# Patient Record
Sex: MALE | Race: WHITE | Employment: OTHER | URBAN - NONMETROPOLITAN AREA
[De-identification: names, ages, dates, MRNs, and addresses within clinical notes are randomized per-mention and may not be internally consistent; named-entity substitution may affect disease eponyms.]

---

## 2017-10-17 DIAGNOSIS — R03.0 ELEVATED BLOOD PRESSURE, SITUATIONAL: Primary | ICD-10-CM

## 2017-10-17 DIAGNOSIS — E78.5 HYPERLIPIDEMIA, UNSPECIFIED HYPERLIPIDEMIA TYPE: ICD-10-CM

## 2017-10-17 RX ORDER — ROSUVASTATIN CALCIUM 10 MG/1
TABLET, COATED ORAL
Qty: 45 TABLET | Refills: 2 | Status: SHIPPED | OUTPATIENT
Start: 2017-10-17 | End: 2018-09-27 | Stop reason: SDUPTHER

## 2017-10-30 DIAGNOSIS — E78.5 HYPERLIPIDEMIA, UNSPECIFIED HYPERLIPIDEMIA TYPE: ICD-10-CM

## 2017-10-30 DIAGNOSIS — R03.0 ELEVATED BLOOD PRESSURE, SITUATIONAL: ICD-10-CM

## 2017-10-30 LAB
ALBUMIN SERPL-MCNC: 4.3 G/DL (ref 3.5–5.2)
ALP BLD-CCNC: 68 U/L (ref 40–130)
ALT SERPL-CCNC: 19 U/L (ref 5–41)
ANION GAP SERPL CALCULATED.3IONS-SCNC: 18 MMOL/L (ref 7–19)
AST SERPL-CCNC: 16 U/L (ref 5–40)
BILIRUB SERPL-MCNC: 0.8 MG/DL (ref 0.2–1.2)
BUN BLDV-MCNC: 15 MG/DL (ref 8–23)
CALCIUM SERPL-MCNC: 9.8 MG/DL (ref 8.8–10.2)
CHLORIDE BLD-SCNC: 97 MMOL/L (ref 98–111)
CHOLESTEROL, TOTAL: 182 MG/DL (ref 160–199)
CO2: 25 MMOL/L (ref 22–29)
CREAT SERPL-MCNC: 0.9 MG/DL (ref 0.5–1.2)
GFR NON-AFRICAN AMERICAN: >60
GLUCOSE BLD-MCNC: 95 MG/DL (ref 74–109)
HCT VFR BLD CALC: 52.8 % (ref 42–52)
HDLC SERPL-MCNC: 35 MG/DL (ref 55–121)
HEMOGLOBIN: 17.6 G/DL (ref 14–18)
LDL CHOLESTEROL CALCULATED: 114 MG/DL
MCH RBC QN AUTO: 31.3 PG (ref 27–31)
MCHC RBC AUTO-ENTMCNC: 33.3 G/DL (ref 33–37)
MCV RBC AUTO: 93.8 FL (ref 80–94)
PDW BLD-RTO: 12.4 % (ref 11.5–14.5)
PLATELET # BLD: 261 K/UL (ref 130–400)
PMV BLD AUTO: 9.5 FL (ref 9.4–12.4)
POTASSIUM SERPL-SCNC: 4.3 MMOL/L (ref 3.5–5)
PROSTATE SPECIFIC ANTIGEN: 2.88 NG/ML (ref 0–4)
RBC # BLD: 5.63 M/UL (ref 4.7–6.1)
SODIUM BLD-SCNC: 140 MMOL/L (ref 136–145)
TOTAL PROTEIN: 7.8 G/DL (ref 6.6–8.7)
TRIGL SERPL-MCNC: 167 MG/DL (ref 0–149)
TSH SERPL DL<=0.05 MIU/L-ACNC: 1.45 UIU/ML (ref 0.27–4.2)
WBC # BLD: 8.5 K/UL (ref 4.8–10.8)

## 2017-11-02 RX ORDER — CHLORAL HYDRATE 500 MG
1000 CAPSULE ORAL DAILY
COMMUNITY
End: 2018-11-13

## 2017-11-07 ENCOUNTER — OFFICE VISIT (OUTPATIENT)
Dept: INTERNAL MEDICINE | Age: 72
End: 2017-11-07
Payer: COMMERCIAL

## 2017-11-07 VITALS
HEART RATE: 72 BPM | HEIGHT: 70 IN | SYSTOLIC BLOOD PRESSURE: 134 MMHG | DIASTOLIC BLOOD PRESSURE: 80 MMHG | BODY MASS INDEX: 33.5 KG/M2 | WEIGHT: 234 LBS

## 2017-11-07 DIAGNOSIS — I10 ESSENTIAL HYPERTENSION: Primary | ICD-10-CM

## 2017-11-07 DIAGNOSIS — E78.2 MIXED HYPERLIPIDEMIA: ICD-10-CM

## 2017-11-07 DIAGNOSIS — Z87.898 HISTORY OF ELEVATED PSA: ICD-10-CM

## 2017-11-07 PROCEDURE — 99214 OFFICE O/P EST MOD 30 MIN: CPT | Performed by: INTERNAL MEDICINE

## 2017-11-07 RX ORDER — LANOLIN ALCOHOL/MO/W.PET/CERES
1000 CREAM (GRAM) TOPICAL DAILY
COMMUNITY

## 2017-11-07 NOTE — PROGRESS NOTES
current care and follow-up  2. Hyperlipidemia stable continue current care and follow-up  3 history of elevated PSA he declines seeing urology but actually his PSA is better this year stable.   For recommend no smoking recommend routine screening and vaccinations which he declines keep up-to-date with routine medical care follow-up call with any problems or complaints

## 2017-11-24 PROBLEM — Z87.898 HISTORY OF ELEVATED PSA: Status: ACTIVE | Noted: 2017-11-24

## 2017-11-24 PROBLEM — Z00.00 ENCOUNTER FOR MEDICARE ANNUAL WELLNESS EXAM: Status: RESOLVED | Noted: 2017-11-24 | Resolved: 2017-11-24

## 2017-11-24 PROBLEM — Z00.00 ENCOUNTER FOR MEDICARE ANNUAL WELLNESS EXAM: Status: ACTIVE | Noted: 2017-11-24

## 2017-11-24 ASSESSMENT — ENCOUNTER SYMPTOMS
COUGH: 0
SINUS PRESSURE: 0
EYE DISCHARGE: 0
SHORTNESS OF BREATH: 0
ABDOMINAL PAIN: 0
ABDOMINAL DISTENTION: 0
EYE REDNESS: 0
BACK PAIN: 0

## 2018-01-19 ENCOUNTER — TELEPHONE (OUTPATIENT)
Dept: INTERNAL MEDICINE | Age: 73
End: 2018-01-19

## 2018-01-19 DIAGNOSIS — H10.9 CONJUNCTIVITIS, UNSPECIFIED CONJUNCTIVITIS TYPE, UNSPECIFIED LATERALITY: Primary | ICD-10-CM

## 2018-01-19 RX ORDER — TOBRAMYCIN 3 MG/ML
1 SOLUTION/ DROPS OPHTHALMIC EVERY 4 HOURS
Qty: 1 BOTTLE | Refills: 0 | Status: SHIPPED | OUTPATIENT
Start: 2018-01-19 | End: 2018-01-29

## 2018-01-19 NOTE — TELEPHONE ENCOUNTER
He refuses to be seen. Has 3 insect bites to top of head and possible pink eye. Eye is gunky and red.    Raji Serene

## 2018-01-19 NOTE — TELEPHONE ENCOUNTER
I sent in eye drops.   Just keep an eye on spider bites and if turn purple or get infected need seen - eye drops sent to [facility]

## 2018-04-23 DIAGNOSIS — Z23 NEED FOR SHINGLES VACCINE: ICD-10-CM

## 2018-09-28 RX ORDER — ROSUVASTATIN CALCIUM 10 MG/1
TABLET, COATED ORAL
Qty: 45 TABLET | Refills: 3 | Status: SHIPPED | OUTPATIENT
Start: 2018-09-28 | End: 2019-10-03 | Stop reason: SDUPTHER

## 2018-11-06 DIAGNOSIS — E78.2 MIXED HYPERLIPIDEMIA: ICD-10-CM

## 2018-11-06 LAB
ALBUMIN SERPL-MCNC: 4.2 G/DL (ref 3.5–5.2)
ALP BLD-CCNC: 65 U/L (ref 40–130)
ALT SERPL-CCNC: 21 U/L (ref 5–41)
ANION GAP SERPL CALCULATED.3IONS-SCNC: 19 MMOL/L (ref 7–19)
AST SERPL-CCNC: 18 U/L (ref 5–40)
BILIRUB SERPL-MCNC: 0.8 MG/DL (ref 0.2–1.2)
BUN BLDV-MCNC: 13 MG/DL (ref 8–23)
CALCIUM SERPL-MCNC: 10.1 MG/DL (ref 8.8–10.2)
CHLORIDE BLD-SCNC: 99 MMOL/L (ref 98–111)
CHOLESTEROL, TOTAL: 190 MG/DL (ref 160–199)
CO2: 24 MMOL/L (ref 22–29)
CREAT SERPL-MCNC: 0.9 MG/DL (ref 0.5–1.2)
GFR NON-AFRICAN AMERICAN: >60
GLUCOSE BLD-MCNC: 99 MG/DL (ref 74–109)
HCT VFR BLD CALC: 52 % (ref 42–52)
HDLC SERPL-MCNC: 38 MG/DL (ref 55–121)
HEMOGLOBIN: 16.8 G/DL (ref 14–18)
LDL CHOLESTEROL CALCULATED: 124 MG/DL
MCH RBC QN AUTO: 30.5 PG (ref 27–31)
MCHC RBC AUTO-ENTMCNC: 32.3 G/DL (ref 33–37)
MCV RBC AUTO: 94.4 FL (ref 80–94)
PDW BLD-RTO: 12.4 % (ref 11.5–14.5)
PLATELET # BLD: 244 K/UL (ref 130–400)
PMV BLD AUTO: 9.8 FL (ref 9.4–12.4)
POTASSIUM SERPL-SCNC: 4.3 MMOL/L (ref 3.5–5)
PROSTATE SPECIFIC ANTIGEN: 2.86 NG/ML (ref 0–4)
RBC # BLD: 5.51 M/UL (ref 4.7–6.1)
SODIUM BLD-SCNC: 142 MMOL/L (ref 136–145)
TOTAL PROTEIN: 7.8 G/DL (ref 6.6–8.7)
TRIGL SERPL-MCNC: 141 MG/DL (ref 0–149)
TSH SERPL DL<=0.05 MIU/L-ACNC: 1.64 UIU/ML (ref 0.27–4.2)
WBC # BLD: 8.6 K/UL (ref 4.8–10.8)

## 2018-11-13 ENCOUNTER — OFFICE VISIT (OUTPATIENT)
Dept: INTERNAL MEDICINE | Age: 73
End: 2018-11-13
Payer: COMMERCIAL

## 2018-11-13 VITALS
DIASTOLIC BLOOD PRESSURE: 80 MMHG | SYSTOLIC BLOOD PRESSURE: 132 MMHG | HEIGHT: 70 IN | BODY MASS INDEX: 33.5 KG/M2 | HEART RATE: 74 BPM | OXYGEN SATURATION: 98 % | WEIGHT: 234 LBS

## 2018-11-13 DIAGNOSIS — I10 ESSENTIAL HYPERTENSION: ICD-10-CM

## 2018-11-13 DIAGNOSIS — Z00.00 MEDICARE ANNUAL WELLNESS VISIT, SUBSEQUENT: Primary | ICD-10-CM

## 2018-11-13 DIAGNOSIS — R31.29 MICROSCOPIC HEMATURIA: ICD-10-CM

## 2018-11-13 DIAGNOSIS — E78.2 MIXED HYPERLIPIDEMIA: ICD-10-CM

## 2018-11-13 DIAGNOSIS — Z87.898 HISTORY OF ELEVATED PSA: ICD-10-CM

## 2018-11-13 DIAGNOSIS — Z12.5 SCREENING FOR PROSTATE CANCER: ICD-10-CM

## 2018-11-13 LAB
BACTERIA: NEGATIVE /HPF
BILIRUBIN URINE: NEGATIVE
BLOOD, URINE: ABNORMAL
CLARITY: CLEAR
COLOR: YELLOW
EPITHELIAL CELLS, UA: 0 /HPF (ref 0–5)
GLUCOSE URINE: NEGATIVE MG/DL
HYALINE CASTS: 0 /HPF (ref 0–8)
KETONES, URINE: NEGATIVE MG/DL
LEUKOCYTE ESTERASE, URINE: NEGATIVE
NITRITE, URINE: NEGATIVE
PH UA: 6.5
PROTEIN UA: ABNORMAL MG/DL
RBC UA: 9 /HPF (ref 0–4)
SPECIFIC GRAVITY UA: 1.02
UROBILINOGEN, URINE: 0.2 E.U./DL
WBC UA: 1 /HPF (ref 0–5)

## 2018-11-13 PROCEDURE — 99214 OFFICE O/P EST MOD 30 MIN: CPT | Performed by: INTERNAL MEDICINE

## 2018-11-13 RX ORDER — BRIMONIDINE TARTRATE, TIMOLOL MALEATE 2; 5 MG/ML; MG/ML
SOLUTION/ DROPS OPHTHALMIC
COMMUNITY
Start: 2018-04-15 | End: 2021-11-18

## 2018-11-13 RX ORDER — PREDNISOLONE ACETATE 10 MG/ML
1 SUSPENSION/ DROPS OPHTHALMIC 2 TIMES DAILY
COMMUNITY
End: 2021-11-18

## 2018-11-13 ASSESSMENT — PATIENT HEALTH QUESTIONNAIRE - PHQ9
SUM OF ALL RESPONSES TO PHQ QUESTIONS 1-9: 0
SUM OF ALL RESPONSES TO PHQ QUESTIONS 1-9: 0

## 2018-11-13 ASSESSMENT — LIFESTYLE VARIABLES: HOW OFTEN DO YOU HAVE A DRINK CONTAINING ALCOHOL: 0

## 2018-11-13 NOTE — PROGRESS NOTES
Chief Complaint   Patient presents with    Medicare AWV    Hypertension    Hyperlipidemia       HPI: Patient is here today for annual exam/ visit and to follow-up hypertension hyperlipidemia. As always he declines all routine screening recommendations see declines colonoscopy he declines seeing the urologist currently he declines any vaccinations. He says he feels well his blood pressure and medications are stable. He did have what was labeled shingles involving his right eye since I last saw him and he is still following up with ophthalmology. 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 It sounds like they treated him for shingles because of some lesions on his hand but this is difficult to sort out the lesions were not painful. Ophthalmology is following him closely. We will get his records. Past Medical History:   Diagnosis Date    Hyperlipidemia     Hypertension        Past Surgical History:   Procedure Laterality Date    TONSILLECTOMY         Family History   Problem Relation Age of Onset    High Blood Pressure Mother     Cancer Mother         supranuclear palsy    High Blood Pressure Father     Heart Disease Father        Social History     Social History    Marital status:      Spouse name: N/A    Number of children: N/A    Years of education: N/A     Occupational History    Not on file.      Social History Main Topics    Smoking status: Current Every Day Smoker     Packs/day: 1.50     Years: 35.00    Smokeless tobacco: Never Used    Alcohol use Not on file    Drug use: Unknown    Sexual activity: Not on file     Other Topics Concern    Not on file     Social History Narrative    No narrative on file       No Known Allergies    Current Outpatient Prescriptions   Medication Sig Dispense

## 2018-11-25 ASSESSMENT — ENCOUNTER SYMPTOMS
SHORTNESS OF BREATH: 0
EYE DISCHARGE: 0
ABDOMINAL PAIN: 0
ABDOMINAL DISTENTION: 0
EYE REDNESS: 0
BACK PAIN: 0
COUGH: 0
SINUS PRESSURE: 0

## 2019-03-05 ENCOUNTER — TELEPHONE (OUTPATIENT)
Dept: INTERNAL MEDICINE | Age: 74
End: 2019-03-05

## 2019-03-05 ENCOUNTER — HOSPITAL ENCOUNTER (OUTPATIENT)
Dept: GENERAL RADIOLOGY | Age: 74
Discharge: HOME | End: 2019-03-05
Payer: MEDICARE

## 2019-03-05 ENCOUNTER — OFFICE VISIT (OUTPATIENT)
Dept: INTERNAL MEDICINE | Age: 74
End: 2019-03-05
Payer: MEDICARE

## 2019-03-05 VITALS
TEMPERATURE: 97.6 F | BODY MASS INDEX: 32.35 KG/M2 | OXYGEN SATURATION: 91 % | DIASTOLIC BLOOD PRESSURE: 70 MMHG | WEIGHT: 226 LBS | HEART RATE: 79 BPM | HEIGHT: 70 IN | SYSTOLIC BLOOD PRESSURE: 120 MMHG

## 2019-03-05 DIAGNOSIS — J20.9 ACUTE BRONCHITIS, UNSPECIFIED ORGANISM: Primary | ICD-10-CM

## 2019-03-05 DIAGNOSIS — R05.9 COUGH: ICD-10-CM

## 2019-03-05 DIAGNOSIS — J20.9 ACUTE BRONCHITIS, UNSPECIFIED ORGANISM: ICD-10-CM

## 2019-03-05 DIAGNOSIS — J44.1 CHRONIC OBSTRUCTIVE PULMONARY DISEASE WITH ACUTE EXACERBATION (HCC): ICD-10-CM

## 2019-03-05 LAB
ALBUMIN SERPL-MCNC: 3.6 G/DL (ref 3.5–5.2)
ALP BLD-CCNC: 78 U/L (ref 40–130)
ALT SERPL-CCNC: 32 U/L (ref 5–41)
ANION GAP SERPL CALCULATED.3IONS-SCNC: 18 MMOL/L (ref 7–19)
AST SERPL-CCNC: 30 U/L (ref 5–40)
BILIRUB SERPL-MCNC: 0.7 MG/DL (ref 0.2–1.2)
BUN BLDV-MCNC: 9 MG/DL (ref 8–23)
C-REACTIVE PROTEIN: 11.26 MG/DL (ref 0–0.5)
CALCIUM SERPL-MCNC: 9.3 MG/DL (ref 8.8–10.2)
CHLORIDE BLD-SCNC: 85 MMOL/L (ref 98–111)
CO2: 26 MMOL/L (ref 22–29)
CREAT SERPL-MCNC: 0.8 MG/DL (ref 0.5–1.2)
GFR NON-AFRICAN AMERICAN: >60
GLUCOSE BLD-MCNC: 89 MG/DL (ref 74–109)
HCT VFR BLD CALC: 45.8 % (ref 42–52)
HEMOGLOBIN: 15.4 G/DL (ref 14–18)
MCH RBC QN AUTO: 31.1 PG (ref 27–31)
MCHC RBC AUTO-ENTMCNC: 33.6 G/DL (ref 33–37)
MCV RBC AUTO: 92.5 FL (ref 80–94)
PDW BLD-RTO: 11.9 % (ref 11.5–14.5)
PLATELET # BLD: 301 K/UL (ref 130–400)
PMV BLD AUTO: 9.6 FL (ref 9.4–12.4)
POTASSIUM SERPL-SCNC: 4 MMOL/L (ref 3.5–5)
RBC # BLD: 4.95 M/UL (ref 4.7–6.1)
SEDIMENTATION RATE, ERYTHROCYTE: 16 MM/HR (ref 0–15)
SODIUM BLD-SCNC: 129 MMOL/L (ref 136–145)
TOTAL PROTEIN: 7.7 G/DL (ref 6.6–8.7)
WBC # BLD: 11.2 K/UL (ref 4.8–10.8)

## 2019-03-05 PROCEDURE — 71046 X-RAY EXAM CHEST 2 VIEWS: CPT

## 2019-03-05 PROCEDURE — 99214 OFFICE O/P EST MOD 30 MIN: CPT | Performed by: INTERNAL MEDICINE

## 2019-03-05 PROCEDURE — 96372 THER/PROPH/DIAG INJ SC/IM: CPT | Performed by: INTERNAL MEDICINE

## 2019-03-05 RX ORDER — METHYLPREDNISOLONE ACETATE 80 MG/ML
80 INJECTION, SUSPENSION INTRA-ARTICULAR; INTRALESIONAL; INTRAMUSCULAR; SOFT TISSUE ONCE
Status: COMPLETED | OUTPATIENT
Start: 2019-03-05 | End: 2019-03-05

## 2019-03-05 RX ORDER — METHYLPREDNISOLONE 4 MG/1
TABLET ORAL
Qty: 1 KIT | Refills: 0 | Status: SHIPPED | OUTPATIENT
Start: 2019-03-05 | End: 2019-03-11

## 2019-03-05 RX ORDER — AZITHROMYCIN 250 MG/1
TABLET, FILM COATED ORAL
Qty: 6 TABLET | Refills: 0 | Status: SHIPPED | OUTPATIENT
Start: 2019-03-05 | End: 2019-04-08 | Stop reason: CLARIF

## 2019-03-05 RX ORDER — IPRATROPIUM BROMIDE AND ALBUTEROL SULFATE 2.5; .5 MG/3ML; MG/3ML
1 SOLUTION RESPIRATORY (INHALATION) EVERY 4 HOURS PRN
Qty: 50 VIAL | Refills: 0 | Status: SHIPPED | OUTPATIENT
Start: 2019-03-05 | End: 2020-11-23 | Stop reason: ALTCHOICE

## 2019-03-05 RX ORDER — CEFTRIAXONE 1 G/1
1 INJECTION, POWDER, FOR SOLUTION INTRAMUSCULAR; INTRAVENOUS ONCE
Status: COMPLETED | OUTPATIENT
Start: 2019-03-05 | End: 2019-03-05

## 2019-03-05 RX ADMIN — CEFTRIAXONE 1 G: 1 INJECTION, POWDER, FOR SOLUTION INTRAMUSCULAR; INTRAVENOUS at 12:07

## 2019-03-05 RX ADMIN — METHYLPREDNISOLONE ACETATE 80 MG: 80 INJECTION, SUSPENSION INTRA-ARTICULAR; INTRALESIONAL; INTRAMUSCULAR; SOFT TISSUE at 12:07

## 2019-03-05 ASSESSMENT — PATIENT HEALTH QUESTIONNAIRE - PHQ9
1. LITTLE INTEREST OR PLEASURE IN DOING THINGS: 0
SUM OF ALL RESPONSES TO PHQ9 QUESTIONS 1 & 2: 0
SUM OF ALL RESPONSES TO PHQ QUESTIONS 1-9: 0
SUM OF ALL RESPONSES TO PHQ QUESTIONS 1-9: 0
2. FEELING DOWN, DEPRESSED OR HOPELESS: 0

## 2019-03-08 ENCOUNTER — OFFICE VISIT (OUTPATIENT)
Dept: INTERNAL MEDICINE | Age: 74
End: 2019-03-08
Payer: MEDICARE

## 2019-03-08 VITALS
DIASTOLIC BLOOD PRESSURE: 80 MMHG | HEART RATE: 62 BPM | SYSTOLIC BLOOD PRESSURE: 120 MMHG | OXYGEN SATURATION: 94 % | WEIGHT: 227 LBS | BODY MASS INDEX: 32.5 KG/M2 | HEIGHT: 70 IN

## 2019-03-08 DIAGNOSIS — J44.1 CHRONIC OBSTRUCTIVE PULMONARY DISEASE WITH ACUTE EXACERBATION (HCC): Primary | ICD-10-CM

## 2019-03-08 DIAGNOSIS — J44.0 ACUTE BRONCHITIS WITH COPD (HCC): ICD-10-CM

## 2019-03-08 DIAGNOSIS — J20.9 ACUTE BRONCHITIS WITH COPD (HCC): ICD-10-CM

## 2019-03-08 PROCEDURE — 99213 OFFICE O/P EST LOW 20 MIN: CPT | Performed by: INTERNAL MEDICINE

## 2019-03-15 DIAGNOSIS — E87.1 HYPONATREMIA: Primary | ICD-10-CM

## 2019-03-21 PROBLEM — J20.9 ACUTE BRONCHITIS WITH COPD (HCC): Status: ACTIVE | Noted: 2019-03-21

## 2019-03-21 PROBLEM — J44.0 ACUTE BRONCHITIS WITH COPD (HCC): Status: ACTIVE | Noted: 2019-03-21

## 2019-04-02 DIAGNOSIS — E87.1 HYPONATREMIA: ICD-10-CM

## 2019-04-02 LAB
ALBUMIN SERPL-MCNC: 4.2 G/DL (ref 3.5–5.2)
ALP BLD-CCNC: 72 U/L (ref 40–130)
ALT SERPL-CCNC: 19 U/L (ref 5–41)
ANION GAP SERPL CALCULATED.3IONS-SCNC: 11 MMOL/L (ref 7–19)
AST SERPL-CCNC: 15 U/L (ref 5–40)
BILIRUB SERPL-MCNC: 0.7 MG/DL (ref 0.2–1.2)
BUN BLDV-MCNC: 16 MG/DL (ref 8–23)
C-REACTIVE PROTEIN: 0.21 MG/DL (ref 0–0.5)
CALCIUM SERPL-MCNC: 9.4 MG/DL (ref 8.8–10.2)
CHLORIDE BLD-SCNC: 96 MMOL/L (ref 98–111)
CO2: 28 MMOL/L (ref 22–29)
CREAT SERPL-MCNC: 0.9 MG/DL (ref 0.5–1.2)
GFR NON-AFRICAN AMERICAN: >60
GLUCOSE BLD-MCNC: 98 MG/DL (ref 74–109)
HCT VFR BLD CALC: 48.5 % (ref 42–52)
HEMOGLOBIN: 15.5 G/DL (ref 14–18)
MCH RBC QN AUTO: 30.8 PG (ref 27–31)
MCHC RBC AUTO-ENTMCNC: 32 G/DL (ref 33–37)
MCV RBC AUTO: 96.4 FL (ref 80–94)
PDW BLD-RTO: 12.1 % (ref 11.5–14.5)
PLATELET # BLD: 243 K/UL (ref 130–400)
PMV BLD AUTO: 9.3 FL (ref 9.4–12.4)
POTASSIUM SERPL-SCNC: 4.9 MMOL/L (ref 3.5–5)
RBC # BLD: 5.03 M/UL (ref 4.7–6.1)
SEDIMENTATION RATE, ERYTHROCYTE: 13 MM/HR (ref 0–15)
SODIUM BLD-SCNC: 135 MMOL/L (ref 136–145)
TOTAL PROTEIN: 7.5 G/DL (ref 6.6–8.7)
TSH SERPL DL<=0.05 MIU/L-ACNC: 1.7 UIU/ML (ref 0.27–4.2)
WBC # BLD: 9.5 K/UL (ref 4.8–10.8)

## 2019-04-08 ENCOUNTER — OFFICE VISIT (OUTPATIENT)
Dept: INTERNAL MEDICINE | Age: 74
End: 2019-04-08
Payer: MEDICARE

## 2019-04-08 VITALS
OXYGEN SATURATION: 95 % | HEART RATE: 69 BPM | DIASTOLIC BLOOD PRESSURE: 70 MMHG | WEIGHT: 233 LBS | BODY MASS INDEX: 33.36 KG/M2 | SYSTOLIC BLOOD PRESSURE: 110 MMHG | HEIGHT: 70 IN

## 2019-04-08 DIAGNOSIS — J44.1 CHRONIC OBSTRUCTIVE PULMONARY DISEASE WITH ACUTE EXACERBATION (HCC): Primary | ICD-10-CM

## 2019-04-08 DIAGNOSIS — E78.2 MIXED HYPERLIPIDEMIA: ICD-10-CM

## 2019-04-08 DIAGNOSIS — I10 ESSENTIAL HYPERTENSION: ICD-10-CM

## 2019-04-08 DIAGNOSIS — J44.0 ACUTE BRONCHITIS WITH COPD (HCC): ICD-10-CM

## 2019-04-08 DIAGNOSIS — J20.9 ACUTE BRONCHITIS WITH COPD (HCC): ICD-10-CM

## 2019-04-08 PROCEDURE — 99213 OFFICE O/P EST LOW 20 MIN: CPT | Performed by: INTERNAL MEDICINE

## 2019-04-08 NOTE — PROGRESS NOTES
organizations: Not on file     Relationship status: Not on file    Intimate partner violence:     Fear of current or ex partner: Not on file     Emotionally abused: Not on file     Physically abused: Not on file     Forced sexual activity: Not on file   Other Topics Concern    Not on file   Social History Narrative    Not on file       No Known Allergies    Current Outpatient Medications   Medication Sig Dispense Refill    ipratropium-albuterol (DUONEB) 0.5-2.5 (3) MG/3ML SOLN nebulizer solution Inhale 3 mLs into the lungs every 4 hours as needed for Shortness of Breath 50 vial 0    brimonidine-timolol (COMBIGAN) 0.2-0.5 % ophthalmic solution by Does not apply route      prednisoLONE acetate (PRED FORTE) 1 % ophthalmic suspension 1 drop 2 times daily      rosuvastatin (CRESTOR) 10 MG tablet TAKE ONE-HALF TABLET BY MOUTH EVERY NIGHT AT BEDTIME 45 tablet 3    vitamin B-12 (CYANOCOBALAMIN) 1000 MCG tablet Take 1,000 mcg by mouth daily      aspirin 81 MG tablet Take 81 mg by mouth daily       No current facility-administered medications for this visit. Review of Systems   Constitutional: Negative for chills, fatigue and fever. HENT: Negative for congestion and sinus pressure. Eyes: Negative for discharge and redness. Respiratory: Positive for cough. Negative for shortness of breath. Cardiovascular: Negative for chest pain, palpitations and leg swelling. Gastrointestinal: Negative for abdominal pain. Musculoskeletal: Negative for arthralgias and back pain. Skin: Negative for rash and wound. Neurological: Negative for dizziness, light-headedness and headaches. Psychiatric/Behavioral: Negative for dysphoric mood and sleep disturbance. The patient is not nervous/anxious.         /70 (Site: Left Upper Arm)   Pulse 69   Ht 5' 10\" (1.778 m)   Wt 233 lb (105.7 kg)   SpO2 95%   BMI 33.43 kg/m²   BP Readings from Last 7 Encounters:   04/08/19 110/70   03/08/19 120/80   03/05/19 120/70 11/13/18 132/80   11/07/17 134/80     Wt Readings from Last 7 Encounters:   04/08/19 233 lb (105.7 kg)   03/08/19 227 lb (103 kg)   03/05/19 226 lb (102.5 kg)   11/13/18 234 lb (106.1 kg)   11/07/17 234 lb (106.1 kg)     BMI Readings from Last 7 Encounters:   04/08/19 33.43 kg/m²   03/08/19 32.57 kg/m²   03/05/19 32.43 kg/m²   11/13/18 33.58 kg/m²   11/07/17 33.58 kg/m²     Resp Readings from Last 7 Encounters:   No data found for Resp       Physical Exam   Constitutional: No distress. Eyes: No scleral icterus. Neck: Neck supple. Cardiovascular: Normal heart sounds. Pulmonary/Chest: He has decreased breath sounds. Musculoskeletal: He exhibits no edema. Lymphadenopathy:     He has no cervical adenopathy. Skin: No rash noted. Psychiatric: He has a normal mood and affect.        Results for orders placed or performed in visit on 04/02/19   C-Reactive Protein   Result Value Ref Range    CRP 0.21 0.00 - 0.50 mg/dL   Sedimentation Rate   Result Value Ref Range    Sed Rate 13 0 - 15 mm/Hr   TSH without Reflex   Result Value Ref Range    TSH 1.700 0.270 - 4.200 uIU/mL   Comprehensive Metabolic Panel   Result Value Ref Range    Sodium 135 (L) 136 - 145 mmol/L    Potassium 4.9 3.5 - 5.0 mmol/L    Chloride 96 (L) 98 - 111 mmol/L    CO2 28 22 - 29 mmol/L    Anion Gap 11 7 - 19 mmol/L    Glucose 98 74 - 109 mg/dL    BUN 16 8 - 23 mg/dL    CREATININE 0.9 0.5 - 1.2 mg/dL    GFR Non-African American >60 >60    Calcium 9.4 8.8 - 10.2 mg/dL    Total Protein 7.5 6.6 - 8.7 g/dL    Alb 4.2 3.5 - 5.2 g/dL    Total Bilirubin 0.7 0.2 - 1.2 mg/dL    Alkaline Phosphatase 72 40 - 130 U/L    ALT 19 5 - 41 U/L    AST 15 5 - 40 U/L   CBC   Result Value Ref Range    WBC 9.5 4.8 - 10.8 K/uL    RBC 5.03 4.70 - 6.10 M/uL    Hemoglobin 15.5 14.0 - 18.0 g/dL    Hematocrit 48.5 42.0 - 52.0 %    MCV 96.4 (H) 80.0 - 94.0 fL    MCH 30.8 27.0 - 31.0 pg    MCHC 32.0 (L) 33.0 - 37.0 g/dL    RDW 12.1 11.5 - 14.5 %    Platelets 732 309 - 903 K/uL    MPV 9.3 (L) 9.4 - 12.4 fL       ASSESSMENT/ PLAN:  1. Chronic obstructive pulmonary disease with acute exacerbation ([facility] [address]  Stable greatly congratulated him on quitting smoking we talked to him about low-dose CT screening he doesn't want to do this. We are going to continue this conversation. The best thing is that he has quit smoking. He is still clearing his throat a little if this is not improved with time he will let me know. 2. Essential hypertension  Stable and f/u    3. Mixed hyperlipidemia  follow    4.  Acute bronchitis with COPD ([facility] [address]  As above- throat clearing should improve with time but if not he will let us know

## 2019-04-14 ASSESSMENT — ENCOUNTER SYMPTOMS
BACK PAIN: 0
ABDOMINAL PAIN: 0
SINUS PRESSURE: 0
EYE DISCHARGE: 0
EYE REDNESS: 0
COUGH: 1
SHORTNESS OF BREATH: 0

## 2019-07-11 ENCOUNTER — TELEPHONE (OUTPATIENT)
Dept: INTERNAL MEDICINE | Age: 74
End: 2019-07-11

## 2019-07-11 DIAGNOSIS — R25.1 TREMOR: Primary | ICD-10-CM

## 2019-07-11 NOTE — TELEPHONE ENCOUNTER
FYI - she called to report that he started having involentary tremor to upper body that keeps repeating. He feels fine otherwise. They are going to ER to check this out.

## 2019-07-15 DIAGNOSIS — R25.1 TREMOR: ICD-10-CM

## 2019-07-15 LAB
ALBUMIN SERPL-MCNC: 4.5 G/DL (ref 3.5–5.2)
ALP BLD-CCNC: 59 U/L (ref 40–130)
ALT SERPL-CCNC: 25 U/L (ref 5–41)
ANION GAP SERPL CALCULATED.3IONS-SCNC: 17 MMOL/L (ref 7–19)
AST SERPL-CCNC: 20 U/L (ref 5–40)
BILIRUB SERPL-MCNC: 1 MG/DL (ref 0.2–1.2)
BUN BLDV-MCNC: 14 MG/DL (ref 8–23)
CALCIUM SERPL-MCNC: 9.8 MG/DL (ref 8.8–10.2)
CHLORIDE BLD-SCNC: 96 MMOL/L (ref 98–111)
CO2: 23 MMOL/L (ref 22–29)
CREAT SERPL-MCNC: 1.1 MG/DL (ref 0.5–1.2)
GFR NON-AFRICAN AMERICAN: >60
GLUCOSE BLD-MCNC: 95 MG/DL (ref 74–109)
HCT VFR BLD CALC: 51.5 % (ref 42–52)
HEMOGLOBIN: 16.4 G/DL (ref 14–18)
MAGNESIUM: 2.3 MG/DL (ref 1.6–2.4)
MCH RBC QN AUTO: 30.5 PG (ref 27–31)
MCHC RBC AUTO-ENTMCNC: 31.8 G/DL (ref 33–37)
MCV RBC AUTO: 95.7 FL (ref 80–94)
PDW BLD-RTO: 11.9 % (ref 11.5–14.5)
PLATELET # BLD: 268 K/UL (ref 130–400)
PMV BLD AUTO: 9.9 FL (ref 9.4–12.4)
POTASSIUM SERPL-SCNC: 4.7 MMOL/L (ref 3.5–5)
RBC # BLD: 5.38 M/UL (ref 4.7–6.1)
SODIUM BLD-SCNC: 136 MMOL/L (ref 136–145)
TOTAL PROTEIN: 8.2 G/DL (ref 6.6–8.7)
TSH SERPL DL<=0.05 MIU/L-ACNC: 1.83 UIU/ML (ref 0.27–4.2)
WBC # BLD: 8 K/UL (ref 4.8–10.8)

## 2019-10-03 RX ORDER — ROSUVASTATIN CALCIUM 10 MG/1
TABLET, COATED ORAL
Qty: 45 TABLET | Refills: 2 | Status: SHIPPED | OUTPATIENT
Start: 2019-10-03 | End: 2020-08-07

## 2019-11-05 PROBLEM — H40.40X0 GLAUCOMA ASSOCIATED WITH OCULAR INFLAMMATION: Status: ACTIVE | Noted: 2018-11-01

## 2019-11-05 PROBLEM — H25.9 AGE-RELATED CATARACT: Status: ACTIVE | Noted: 2018-11-01

## 2019-11-12 DIAGNOSIS — R31.29 MICROSCOPIC HEMATURIA: ICD-10-CM

## 2019-11-12 DIAGNOSIS — E78.2 MIXED HYPERLIPIDEMIA: ICD-10-CM

## 2019-11-12 DIAGNOSIS — Z12.5 SCREENING FOR PROSTATE CANCER: ICD-10-CM

## 2019-11-12 LAB
ALBUMIN SERPL-MCNC: 4.1 G/DL (ref 3.5–5.2)
ALP BLD-CCNC: 62 U/L (ref 40–130)
ALT SERPL-CCNC: 18 U/L (ref 5–41)
ANION GAP SERPL CALCULATED.3IONS-SCNC: 13 MMOL/L (ref 7–19)
AST SERPL-CCNC: 16 U/L (ref 5–40)
BACTERIA: NEGATIVE /HPF
BILIRUB SERPL-MCNC: 0.4 MG/DL (ref 0.2–1.2)
BILIRUBIN URINE: NEGATIVE
BLOOD, URINE: ABNORMAL
BUN BLDV-MCNC: 18 MG/DL (ref 8–23)
CALCIUM SERPL-MCNC: 9.5 MG/DL (ref 8.8–10.2)
CHLORIDE BLD-SCNC: 101 MMOL/L (ref 98–111)
CHOLESTEROL, TOTAL: 140 MG/DL (ref 160–199)
CLARITY: CLEAR
CO2: 25 MMOL/L (ref 22–29)
COLOR: YELLOW
CREAT SERPL-MCNC: 0.8 MG/DL (ref 0.5–1.2)
EPITHELIAL CELLS, UA: 0 /HPF (ref 0–5)
GFR NON-AFRICAN AMERICAN: >60
GLUCOSE BLD-MCNC: 113 MG/DL (ref 74–109)
GLUCOSE URINE: NEGATIVE MG/DL
HCT VFR BLD CALC: 46.6 % (ref 42–52)
HDLC SERPL-MCNC: 31 MG/DL (ref 55–121)
HEMOGLOBIN: 15.3 G/DL (ref 14–18)
HYALINE CASTS: 1 /HPF (ref 0–8)
KETONES, URINE: NEGATIVE MG/DL
LDL CHOLESTEROL CALCULATED: 86 MG/DL
LEUKOCYTE ESTERASE, URINE: NEGATIVE
MCH RBC QN AUTO: 31 PG (ref 27–31)
MCHC RBC AUTO-ENTMCNC: 32.8 G/DL (ref 33–37)
MCV RBC AUTO: 94.3 FL (ref 80–94)
NITRITE, URINE: NEGATIVE
PDW BLD-RTO: 12.1 % (ref 11.5–14.5)
PH UA: 6 (ref 5–8)
PLATELET # BLD: 221 K/UL (ref 130–400)
PMV BLD AUTO: 10.1 FL (ref 9.4–12.4)
POTASSIUM SERPL-SCNC: 4 MMOL/L (ref 3.5–5)
PROSTATE SPECIFIC ANTIGEN: 3.03 NG/ML (ref 0–4)
PROTEIN UA: NEGATIVE MG/DL
RBC # BLD: 4.94 M/UL (ref 4.7–6.1)
RBC UA: 2 /HPF (ref 0–4)
SODIUM BLD-SCNC: 139 MMOL/L (ref 136–145)
SPECIFIC GRAVITY UA: 1.02 (ref 1–1.03)
TOTAL PROTEIN: 7.3 G/DL (ref 6.6–8.7)
TRIGL SERPL-MCNC: 115 MG/DL (ref 0–149)
TSH SERPL DL<=0.05 MIU/L-ACNC: 0.97 UIU/ML (ref 0.27–4.2)
UROBILINOGEN, URINE: 0.2 E.U./DL
WBC # BLD: 7.2 K/UL (ref 4.8–10.8)
WBC UA: 1 /HPF (ref 0–5)

## 2019-11-19 ENCOUNTER — OFFICE VISIT (OUTPATIENT)
Dept: INTERNAL MEDICINE | Age: 74
End: 2019-11-19
Payer: MEDICARE

## 2019-11-19 VITALS
HEIGHT: 70 IN | OXYGEN SATURATION: 96 % | SYSTOLIC BLOOD PRESSURE: 110 MMHG | WEIGHT: 231 LBS | HEART RATE: 65 BPM | DIASTOLIC BLOOD PRESSURE: 60 MMHG | BODY MASS INDEX: 33.07 KG/M2

## 2019-11-19 DIAGNOSIS — I10 ESSENTIAL HYPERTENSION: ICD-10-CM

## 2019-11-19 DIAGNOSIS — Z12.5 SCREENING FOR PROSTATE CANCER: ICD-10-CM

## 2019-11-19 DIAGNOSIS — H61.23 BILATERAL HEARING LOSS DUE TO CERUMEN IMPACTION: ICD-10-CM

## 2019-11-19 DIAGNOSIS — Z87.898 HISTORY OF ELEVATED PSA: ICD-10-CM

## 2019-11-19 DIAGNOSIS — J44.1 CHRONIC OBSTRUCTIVE PULMONARY DISEASE WITH ACUTE EXACERBATION (HCC): ICD-10-CM

## 2019-11-19 DIAGNOSIS — H40.40X0 GLAUCOMA ASSOCIATED WITH OCULAR INFLAMMATION, UNSPECIFIED GLAUCOMA STAGE, UNSPECIFIED LATERALITY: ICD-10-CM

## 2019-11-19 DIAGNOSIS — R73.9 HYPERGLYCEMIA: ICD-10-CM

## 2019-11-19 DIAGNOSIS — E78.2 MIXED HYPERLIPIDEMIA: ICD-10-CM

## 2019-11-19 DIAGNOSIS — Z00.00 MEDICARE ANNUAL WELLNESS VISIT, SUBSEQUENT: Primary | ICD-10-CM

## 2019-11-19 LAB — HBA1C MFR BLD: 5.9 %

## 2019-11-19 PROCEDURE — 99214 OFFICE O/P EST MOD 30 MIN: CPT | Performed by: INTERNAL MEDICINE

## 2019-11-19 PROCEDURE — 83036 HEMOGLOBIN GLYCOSYLATED A1C: CPT | Performed by: INTERNAL MEDICINE

## 2019-11-19 ASSESSMENT — ENCOUNTER SYMPTOMS
ABDOMINAL PAIN: 0
ABDOMINAL DISTENTION: 0
SINUS PRESSURE: 0
EYE REDNESS: 0
COUGH: 0
SHORTNESS OF BREATH: 0
BACK PAIN: 0
EYE DISCHARGE: 0

## 2020-08-07 RX ORDER — ROSUVASTATIN CALCIUM 10 MG/1
TABLET, COATED ORAL
Qty: 45 TABLET | Refills: 1 | Status: SHIPPED | OUTPATIENT
Start: 2020-08-07 | End: 2021-03-16

## 2020-11-16 DIAGNOSIS — Z12.5 SCREENING FOR PROSTATE CANCER: ICD-10-CM

## 2020-11-16 DIAGNOSIS — E78.2 MIXED HYPERLIPIDEMIA: ICD-10-CM

## 2020-11-16 DIAGNOSIS — R73.9 HYPERGLYCEMIA: ICD-10-CM

## 2020-11-16 LAB
ALBUMIN SERPL-MCNC: 4.8 G/DL (ref 3.5–5.2)
ALP BLD-CCNC: 66 U/L (ref 40–130)
ALT SERPL-CCNC: 19 U/L (ref 5–41)
ANION GAP SERPL CALCULATED.3IONS-SCNC: 9 MMOL/L (ref 7–19)
AST SERPL-CCNC: 16 U/L (ref 5–40)
BILIRUB SERPL-MCNC: 0.5 MG/DL (ref 0.2–1.2)
BUN BLDV-MCNC: 23 MG/DL (ref 8–23)
CALCIUM SERPL-MCNC: 9.5 MG/DL (ref 8.8–10.2)
CHLORIDE BLD-SCNC: 104 MMOL/L (ref 98–111)
CHOLESTEROL, TOTAL: 168 MG/DL (ref 160–199)
CO2: 27 MMOL/L (ref 22–29)
CREAT SERPL-MCNC: 0.7 MG/DL (ref 0.5–1.2)
GFR AFRICAN AMERICAN: >59
GFR NON-AFRICAN AMERICAN: >60
GLUCOSE BLD-MCNC: 103 MG/DL (ref 74–109)
HBA1C MFR BLD: 5.7 % (ref 4–6)
HCT VFR BLD CALC: 48.5 % (ref 42–52)
HDLC SERPL-MCNC: 45 MG/DL (ref 55–121)
HEMOGLOBIN: 15.6 G/DL (ref 14–18)
LDL CHOLESTEROL CALCULATED: 110 MG/DL
MCH RBC QN AUTO: 31 PG (ref 27–31)
MCHC RBC AUTO-ENTMCNC: 32.2 G/DL (ref 33–37)
MCV RBC AUTO: 96.2 FL (ref 80–94)
PDW BLD-RTO: 12.4 % (ref 11.5–14.5)
PLATELET # BLD: 241 K/UL (ref 130–400)
PMV BLD AUTO: 9.6 FL (ref 9.4–12.4)
POTASSIUM SERPL-SCNC: 4.2 MMOL/L (ref 3.5–5)
PROSTATE SPECIFIC ANTIGEN: 3.24 NG/ML (ref 0–4)
RBC # BLD: 5.04 M/UL (ref 4.7–6.1)
SODIUM BLD-SCNC: 140 MMOL/L (ref 136–145)
TOTAL PROTEIN: 7.4 G/DL (ref 6.6–8.7)
TRIGL SERPL-MCNC: 65 MG/DL (ref 0–149)
TSH SERPL DL<=0.05 MIU/L-ACNC: 1.57 UIU/ML (ref 0.27–4.2)
WBC # BLD: 6.6 K/UL (ref 4.8–10.8)

## 2020-11-20 ASSESSMENT — LIFESTYLE VARIABLES
HAS A RELATIVE, FRIEND, DOCTOR, OR ANOTHER HEALTH PROFESSIONAL EXPRESSED CONCERN ABOUT YOUR DRINKING OR SUGGESTED YOU CUT DOWN: NO
HOW OFTEN DURING THE LAST YEAR HAVE YOU FOUND THAT YOU WERE NOT ABLE TO STOP DRINKING ONCE YOU HAD STARTED: 0
HOW OFTEN DURING THE LAST YEAR HAVE YOU HAD A FEELING OF GUILT OR REMORSE AFTER DRINKING: NEVER
HOW OFTEN DO YOU HAVE SIX OR MORE DRINKS ON ONE OCCASION: 0
HOW OFTEN DO YOU HAVE A DRINK CONTAINING ALCOHOL: 1
AUDIT-C TOTAL SCORE: 0
AUDIT-C TOTAL SCORE: 1
HOW MANY STANDARD DRINKS CONTAINING ALCOHOL DO YOU HAVE ON A TYPICAL DAY: ONE OR TWO
HOW MANY STANDARD DRINKS CONTAINING ALCOHOL DO YOU HAVE ON A TYPICAL DAY: 0
HOW OFTEN DO YOU HAVE SIX OR MORE DRINKS ON ONE OCCASION: NEVER
AUDIT TOTAL SCORE: 1
HOW OFTEN DURING THE LAST YEAR HAVE YOU FAILED TO DO WHAT WAS NORMALLY EXPECTED FROM YOU BECAUSE OF DRINKING: 0
HOW OFTEN DURING THE LAST YEAR HAVE YOU BEEN UNABLE TO REMEMBER WHAT HAPPENED THE NIGHT BEFORE BECAUSE YOU HAD BEEN DRINKING: NEVER
HOW OFTEN DURING THE LAST YEAR HAVE YOU BEEN UNABLE TO REMEMBER WHAT HAPPENED THE NIGHT BEFORE BECAUSE YOU HAD BEEN DRINKING: 0
HOW OFTEN DURING THE LAST YEAR HAVE YOU FAILED TO DO WHAT WAS NORMALLY EXPECTED FROM YOU BECAUSE OF DRINKING: NEVER
HOW OFTEN DURING THE LAST YEAR HAVE YOU FOUND THAT YOU WERE NOT ABLE TO STOP DRINKING ONCE YOU HAD STARTED: NEVER
HOW OFTEN DURING THE LAST YEAR HAVE YOU NEEDED AN ALCOHOLIC DRINK FIRST THING IN THE MORNING TO GET YOURSELF GOING AFTER A NIGHT OF HEAVY DRINKING: 0
HOW OFTEN DURING THE LAST YEAR HAVE YOU NEEDED AN ALCOHOLIC DRINK FIRST THING IN THE MORNING TO GET YOURSELF GOING AFTER A NIGHT OF HEAVY DRINKING: NEVER
HAVE YOU OR SOMEONE ELSE BEEN INJURED AS A RESULT OF YOUR DRINKING: NO
HOW OFTEN DURING THE LAST YEAR HAVE YOU HAD A FEELING OF GUILT OR REMORSE AFTER DRINKING: 0
HOW OFTEN DO YOU HAVE A DRINK CONTAINING ALCOHOL: MONTHLY OR LESS
AUDIT TOTAL SCORE: 0
HAS A RELATIVE, FRIEND, DOCTOR, OR ANOTHER HEALTH PROFESSIONAL EXPRESSED CONCERN ABOUT YOUR DRINKING OR SUGGESTED YOU CUT DOWN: 0
HAVE YOU OR SOMEONE ELSE BEEN INJURED AS A RESULT OF YOUR DRINKING: 0

## 2020-11-20 ASSESSMENT — PATIENT HEALTH QUESTIONNAIRE - PHQ9
2. FEELING DOWN, DEPRESSED OR HOPELESS: 0
SUM OF ALL RESPONSES TO PHQ9 QUESTIONS 1 & 2: 0
SUM OF ALL RESPONSES TO PHQ QUESTIONS 1-9: 0
1. LITTLE INTEREST OR PLEASURE IN DOING THINGS: 0
SUM OF ALL RESPONSES TO PHQ QUESTIONS 1-9: 0
SUM OF ALL RESPONSES TO PHQ QUESTIONS 1-9: 0

## 2020-11-23 ENCOUNTER — OFFICE VISIT (OUTPATIENT)
Dept: INTERNAL MEDICINE | Age: 75
End: 2020-11-23
Payer: MEDICARE

## 2020-11-23 VITALS
HEART RATE: 54 BPM | SYSTOLIC BLOOD PRESSURE: 116 MMHG | HEIGHT: 70 IN | OXYGEN SATURATION: 95 % | WEIGHT: 201 LBS | DIASTOLIC BLOOD PRESSURE: 70 MMHG | BODY MASS INDEX: 28.77 KG/M2

## 2020-11-23 PROCEDURE — G0439 PPPS, SUBSEQ VISIT: HCPCS | Performed by: INTERNAL MEDICINE

## 2020-11-23 NOTE — PROGRESS NOTES
Chief Complaint   Patient presents with    Medicare AWV    COPD       HPI: Patient is here today for Medicare annual wellness visit and to follow-up COPD hypertension hyperlipidemia history of glaucoma. Has some watery eyes at times dry eyes at times no new vision change no headache no chest pain no dyspnea no abdominal pain he said an excellent job losing weight keeping his weight off staying off alcohol and staying off tobacco we congratulated him on this. He denies any fever chills nausea vomiting or diarrhea.     Past Medical History:   Diagnosis Date    Hyperlipidemia     Hypertension        Past Surgical History:   Procedure Laterality Date    TONSILLECTOMY         Family History   Problem Relation Age of Onset    High Blood Pressure Mother     Cancer Mother         supranuclear palsy    High Blood Pressure Father     Heart Disease Father        Social History     Socioeconomic History    Marital status:      Spouse name: Not on file    Number of children: Not on file    Years of education: Not on file    Highest education level: Not on file   Occupational History    Not on file   Social Needs    Financial resource strain: Not on file    Food insecurity     Worry: Not on file     Inability: Not on file    Transportation needs     Medical: Not on file     Non-medical: Not on file   Tobacco Use    Smoking status: Former Smoker     Packs/day: 1.50     Years: 35.00     Pack years: 52.50     Last attempt to quit: 3/4/2019     Years since quittin.7    Smokeless tobacco: Never Used   Substance and Sexual Activity    Alcohol use: Not on file    Drug use: Not on file    Sexual activity: Not on file   Lifestyle    Physical activity     Days per week: Not on file     Minutes per session: Not on file    Stress: Not on file   Relationships    Social connections     Talks on phone: Not on file     Gets together: Not on file     Attends Buddhist service: Not on file     Active member of 03/08/19 120/80   03/05/19 120/70   11/13/18 132/80   11/07/17 134/80     Wt Readings from Last 7 Encounters:   11/23/20 201 lb (91.2 kg)   11/19/19 231 lb (104.8 kg)   04/08/19 233 lb (105.7 kg)   03/08/19 227 lb (103 kg)   03/05/19 226 lb (102.5 kg)   11/13/18 234 lb (106.1 kg)   11/07/17 234 lb (106.1 kg)     BMI Readings from Last 7 Encounters:   11/23/20 28.84 kg/m²   11/19/19 33.15 kg/m²   04/08/19 33.43 kg/m²   03/08/19 32.57 kg/m²   03/05/19 32.43 kg/m²   11/13/18 33.58 kg/m²   11/07/17 33.58 kg/m²     Resp Readings from Last 7 Encounters:   No data found for Resp       Physical Exam  Constitutional:       General: He is not in acute distress. Appearance: Normal appearance. He is well-developed. HENT:      Right Ear: External ear normal. Tympanic membrane is not injected. Left Ear: External ear normal. Tympanic membrane is not injected. Mouth/Throat:      Pharynx: No oropharyngeal exudate. Eyes:      General: No scleral icterus. Conjunctiva/sclera: Conjunctivae normal.   Neck:      Musculoskeletal: Neck supple. Thyroid: No thyroid mass or thyromegaly. Vascular: No carotid bruit. Cardiovascular:      Rate and Rhythm: Normal rate and regular rhythm. Heart sounds: S1 normal and S2 normal. No murmur. No S3 or S4 sounds. Pulmonary:      Effort: Pulmonary effort is normal. No respiratory distress. Breath sounds: Normal breath sounds. No wheezing or rales. Abdominal:      General: Bowel sounds are normal. There is no distension. Palpations: Abdomen is soft. There is no mass. Tenderness: There is no abdominal tenderness. Lymphadenopathy:      Cervical: No cervical adenopathy. Upper Body:      Right upper body: No supraclavicular adenopathy. Left upper body: No supraclavicular adenopathy. Skin:     Findings: No rash. Neurological:      Mental Status: He is alert and oriented to person, place, and time.       Cranial Nerves: No cranial nerve deficit. Results for orders placed or performed in visit on 11/16/20   Psa screening   Result Value Ref Range    PSA 3.24 0.00 - 4.00 ng/mL   Lipid Panel   Result Value Ref Range    Cholesterol, Total 168 160 - 199 mg/dL    Triglycerides 65 0 - 149 mg/dL    HDL 45 (L) 55 - 121 mg/dL    LDL Calculated 110 <100 mg/dL   TSH without Reflex   Result Value Ref Range    TSH 1.570 0.270 - 4.200 uIU/mL   Hemoglobin A1C   Result Value Ref Range    Hemoglobin A1C 5.7 4.0 - 6.0 %   Comprehensive Metabolic Panel   Result Value Ref Range    Sodium 140 136 - 145 mmol/L    Potassium 4.2 3.5 - 5.0 mmol/L    Chloride 104 98 - 111 mmol/L    CO2 27 22 - 29 mmol/L    Anion Gap 9 7 - 19 mmol/L    Glucose 103 74 - 109 mg/dL    BUN 23 8 - 23 mg/dL    CREATININE 0.7 0.5 - 1.2 mg/dL    GFR Non-African American >60 >60    GFR African American >59 >59    Calcium 9.5 8.8 - 10.2 mg/dL    Total Protein 7.4 6.6 - 8.7 g/dL    Alb 4.8 3.5 - 5.2 g/dL    Total Bilirubin 0.5 0.2 - 1.2 mg/dL    Alkaline Phosphatase 66 40 - 130 U/L    ALT 19 5 - 41 U/L    AST 16 5 - 40 U/L   CBC   Result Value Ref Range    WBC 6.6 4.8 - 10.8 K/uL    RBC 5.04 4.70 - 6.10 M/uL    Hemoglobin 15.6 14.0 - 18.0 g/dL    Hematocrit 48.5 42.0 - 52.0 %    MCV 96.2 (H) 80.0 - 94.0 fL    MCH 31.0 27.0 - 31.0 pg    MCHC 32.2 (L) 33.0 - 37.0 g/dL    RDW 12.4 11.5 - 14.5 %    Platelets 781 935 - 126 K/uL    MPV 9.6 9.4 - 12.4 fL       ASSESSMENT/ PLAN:  1. Medicare annual wellness visit, subsequent  Chart medications labs vaccines reviewed keep up-to-date with routine medical care and follow-up call with any problems or complaints. He declines all vaccines he declines colonoscopy CT screening for lung cancer etc.    2. Routine general medical examination at a health care facility  As above he declines all routine screening recommendations    3. History of elevated PSA  Elevated PSA but still in the normal range has seen urology in the past no new symptoms follow    4. Screening for prostate cancer    - Psa screening; Future    5. Mixed hyperlipidemia    - CBC; Future  - Comprehensive Metabolic Panel; Future  - TSH without Reflex; Future  - Lipid Panel; Future    6. Hyperglycemia    - Hemoglobin A1C; Future    7. Essential hypertension  Stable and f/u    8. Chronic obstructive pulmonary disease with acute exacerbation (HCC)  Stable and f/u    9. Glaucoma associated with ocular inflammation, unspecified glaucoma stage, unspecified laterality  We will make an appointment with Dr. Ruth Ann Wilson check    8. Bilateral impacted cerumen  We were going to flush his ears but did not we can do this in the future                    Medicare Annual Wellness Visit  Name: Agusto Syed Date: 2020   MRN: [id number] Sex: Male   Age: 67 y.o. Ethnicity: Non-/Non    : 1945 Race: Maritza Carrion is here for Medicare AWV and COPD    Screenings for behavioral, psychosocial and functional/safety risks, and cognitive dysfunction are all negative except as indicated below. These results, as well as other patient data from the [address] form, are documented in Flowsheets linked to this Encounter. No Known Allergies      Prior to Visit Medications    Medication Sig Taking?  Authorizing Provider   rosuvastatin (CRESTOR) 10 MG tablet TAKE ONE-HALF TABLET BY MOUTH AT BEDTIME  Marquez Nieves MD   brimonidine-timolol (COMBIGAN) 0.2-0.5 % ophthalmic solution by Does not apply route  Historical Provider, MD   prednisoLONE acetate (PRED FORTE) 1 % ophthalmic suspension 1 drop 2 times daily  Historical Provider, MD   vitamin B-12 (CYANOCOBALAMIN) 1000 MCG tablet Take 1,000 mcg by mouth daily  Historical Provider, MD   aspirin 81 MG tablet Take 81 mg by mouth daily  Historical Provider, MD         Past Medical History:   Diagnosis Date    Hyperlipidemia     Hypertension        Past Surgical History:   Procedure Laterality Date    TONSILLECTOMY           Family History   Problem Relation Age of Onset    High Blood Pressure Mother     Cancer Mother         supranuclear palsy    High Blood Pressure Father     Heart Disease Father        CareTeam (Including outside providers/suppliers regularly involved in providing care):   Patient Care Team:  Ava Brannon MD as PCP - General (Internal Medicine)  Ava Brannon MD as PCP - [facility]    Wt Readings from Last 3 Encounters:   11/23/20 201 lb (91.2 kg)   11/19/19 231 lb (104.8 kg)   04/08/19 233 lb (105.7 kg)     Vitals:    11/23/20 1239   BP: 116/70   Pulse: 54   SpO2: 95%   Weight: 201 lb (91.2 kg)   Height: 5' 10\" (1.778 m)     Body mass index is 28.84 kg/m². Based upon direct observation of the patient, evaluation of cognition reveals no issues    Patient's complete Health Risk Assessment and screening values have been reviewed and are found in Flowsheets. The following problems were reviewed today and where indicated follow up appointments were made and/or referrals ordered. Positive Risk Factor Screenings with Interventions:       General Health and ACP:  General  In general, how would you say your health is?: Good  In the past 7 days, have you experienced any of the following?  New or Increased Pain, New or Increased Fatigue, Loneliness, Social Isolation, Stress or Anger?: None of These  Do you get the social and emotional support that you need?: Yes  Do you have a Living Will?: Yes  Advance Directives     Power of [address] Will ACP-Advance Directive ACP-Power of     Not on File Not on File Filed Filed      General Health Risk Interventions:  · Lives with wife    Health Habits/Nutrition:  Health Habits/Nutrition  Do you exercise for at least 20 minutes 2-3 times per week?: Yes  Have you lost any weight without trying in the past 3 months?: No  Do you eat fewer than 2 meals per day?: (!) Yes  Have you seen a dentist within the past year?: Yes  Body mass index: (!) 28.84  Health Habits/Nutrition Interventions:  · Great job with stopping drinking stopping smoking and with weight loss    Hearing/Vision:  No exam data present  Hearing/Vision  Do you or your family notice any trouble with your hearing?: No  Do you have difficulty driving, watching TV, or doing any of your daily activities because of your eyesight?: No  Have you had an eye exam within the past year?: (!) No  Hearing/Vision Interventions:  · Patient and his wife are scheduling an appointment with ophthalmology    Personalized Preventive Plan   Current Health Maintenance Status    There is no immunization history on file for this patient. Health Maintenance   Topic Date Due    DTaP/Tdap/Td vaccine (1 - Tdap) 09[birth year]06/1963    Shingles Vaccine (1 of 2) 09/06/1994    Pneumococcal 65+ years Vaccine (1 of 1 - PPSV23) 09/06/2009    Annual Wellness Visit (AWV)  05/29/2019    Flu vaccine (1) 09/01/2020    Lipid screen  11/16/2021    Hepatitis A vaccine  Aged Out    Hepatitis B vaccine  Aged Out    Hib vaccine  Aged Out    Meningococcal (ACWY) vaccine  Aged Out     Recommendations for [employer] Due: see orders and patient instructions/AVS.  . Recommended screening schedule for the next 5-10 years is provided to the patient in written form: see Patient Instructions/AVS.    Dedra Randhawa was seen today for medicare awv and copd. Diagnoses and all orders for this visit:    Medicare annual wellness visit, subsequent    Routine general medical examination at a health care facility    History of elevated PSA    Screening for prostate cancer  -     Psa screening; Future    Mixed hyperlipidemia  -     CBC; Future  -     Comprehensive Metabolic Panel; Future  -     TSH without Reflex; Future  -     Lipid Panel;  Future    Hyperglycemia  -     Hemoglobin A1C; Future    Essential hypertension    Chronic obstructive pulmonary disease with acute exacerbation (HCC)    Glaucoma associated with ocular inflammation, unspecified glaucoma

## 2020-11-23 NOTE — PATIENT INSTRUCTIONS
Personalized Preventive Plan for Isidoro Bullard - 11/23/2020  Medicare offers a range of preventive health benefits. Some of the tests and screenings are paid in full while other may be subject to a deductible, co-insurance, and/or copay. Some of these benefits include a comprehensive review of your medical history including lifestyle, illnesses that may run in your family, and various assessments and screenings as appropriate. After reviewing your medical record and screening and assessments performed today your provider may have ordered immunizations, labs, imaging, and/or referrals for you. A list of these orders (if applicable) as well as your Preventive Care list are included within your After Visit Summary for your review. Other Preventive Recommendations:    · A preventive eye exam performed by an eye specialist is recommended every 1-2 years to screen for glaucoma; cataracts, macular degeneration, and other eye disorders. · A preventive dental visit is recommended every 6 months. · Try to get at least 150 minutes of exercise per week or 10,000 steps per day on a pedometer . · Order or download the FREE \"Exercise & Physical Activity: Your Everyday Guide\" from [employer]. Call 8-244.393.4773 or search [employer] online. · You need 2970-4265 mg of calcium and 6066-9686 IU of vitamin D per day. It is possible to meet your calcium requirement with diet alone, but a vitamin D supplement is usually necessary to meet this goal.  · When exposed to the sun, use a sunscreen that protects against both UVA and UVB radiation with an SPF of 30 or greater. Reapply every 2 to 3 hours or after sweating, drying off with a towel, or swimming. · Always wear a seat belt when traveling in a car. Always wear a helmet when riding a bicycle or motorcycle.

## 2020-11-26 PROBLEM — H61.23 BILATERAL IMPACTED CERUMEN: Status: ACTIVE | Noted: 2020-11-26

## 2020-11-26 ASSESSMENT — ENCOUNTER SYMPTOMS
COUGH: 0
SINUS PRESSURE: 0
ABDOMINAL PAIN: 0
ABDOMINAL DISTENTION: 0
SHORTNESS OF BREATH: 0
EYE DISCHARGE: 0
BACK PAIN: 0
EYE REDNESS: 0

## 2021-11-11 DIAGNOSIS — R73.9 HYPERGLYCEMIA: ICD-10-CM

## 2021-11-11 DIAGNOSIS — Z12.5 SCREENING FOR PROSTATE CANCER: ICD-10-CM

## 2021-11-11 DIAGNOSIS — E78.2 MIXED HYPERLIPIDEMIA: ICD-10-CM

## 2021-11-11 LAB
ALBUMIN SERPL-MCNC: 4.3 G/DL (ref 3.5–5.2)
ALP BLD-CCNC: 65 U/L (ref 40–130)
ALT SERPL-CCNC: 23 U/L (ref 5–41)
ANION GAP SERPL CALCULATED.3IONS-SCNC: 11 MMOL/L (ref 7–19)
AST SERPL-CCNC: 33 U/L (ref 5–40)
BILIRUB SERPL-MCNC: 0.8 MG/DL (ref 0.2–1.2)
BUN BLDV-MCNC: 22 MG/DL (ref 8–23)
CALCIUM SERPL-MCNC: 9.7 MG/DL (ref 8.8–10.2)
CHLORIDE BLD-SCNC: 101 MMOL/L (ref 98–111)
CHOLESTEROL, TOTAL: 173 MG/DL (ref 160–199)
CO2: 25 MMOL/L (ref 22–29)
CREAT SERPL-MCNC: 0.9 MG/DL (ref 0.5–1.2)
GFR AFRICAN AMERICAN: >59
GFR NON-AFRICAN AMERICAN: >60
GLUCOSE BLD-MCNC: 98 MG/DL (ref 74–109)
HBA1C MFR BLD: 5.7 % (ref 4–6)
HCT VFR BLD CALC: 45.3 % (ref 42–52)
HDLC SERPL-MCNC: 36 MG/DL (ref 55–121)
HEMOGLOBIN: 15 G/DL (ref 14–18)
LDL CHOLESTEROL CALCULATED: 115 MG/DL
MCH RBC QN AUTO: 31.3 PG (ref 27–31)
MCHC RBC AUTO-ENTMCNC: 33.1 G/DL (ref 33–37)
MCV RBC AUTO: 94.4 FL (ref 80–94)
PDW BLD-RTO: 12.2 % (ref 11.5–14.5)
PLATELET # BLD: 226 K/UL (ref 130–400)
PMV BLD AUTO: 10.5 FL (ref 9.4–12.4)
POTASSIUM SERPL-SCNC: 4.2 MMOL/L (ref 3.5–5)
PROSTATE SPECIFIC ANTIGEN: 3.35 NG/ML (ref 0–4)
RBC # BLD: 4.8 M/UL (ref 4.7–6.1)
SODIUM BLD-SCNC: 137 MMOL/L (ref 136–145)
TOTAL PROTEIN: 7.3 G/DL (ref 6.6–8.7)
TRIGL SERPL-MCNC: 109 MG/DL (ref 0–149)
TSH SERPL DL<=0.05 MIU/L-ACNC: 1.18 UIU/ML (ref 0.27–4.2)
WBC # BLD: 8 K/UL (ref 4.8–10.8)

## 2021-11-11 ASSESSMENT — PATIENT HEALTH QUESTIONNAIRE - PHQ9
SUM OF ALL RESPONSES TO PHQ QUESTIONS 1-9: 0
SUM OF ALL RESPONSES TO PHQ QUESTIONS 1-9: 0
1. LITTLE INTEREST OR PLEASURE IN DOING THINGS: 0
SUM OF ALL RESPONSES TO PHQ QUESTIONS 1-9: 0
SUM OF ALL RESPONSES TO PHQ9 QUESTIONS 1 & 2: 0
2. FEELING DOWN, DEPRESSED OR HOPELESS: 0

## 2021-11-11 ASSESSMENT — LIFESTYLE VARIABLES
HOW OFTEN DO YOU HAVE A DRINK CONTAINING ALCOHOL: 0
AUDIT TOTAL SCORE: INCOMPLETE
AUDIT-C TOTAL SCORE: INCOMPLETE
HOW OFTEN DO YOU HAVE A DRINK CONTAINING ALCOHOL: NEVER

## 2021-11-18 ENCOUNTER — OFFICE VISIT (OUTPATIENT)
Dept: INTERNAL MEDICINE | Age: 76
End: 2021-11-18
Payer: MEDICARE

## 2021-11-18 VITALS
SYSTOLIC BLOOD PRESSURE: 128 MMHG | HEART RATE: 58 BPM | HEIGHT: 70 IN | DIASTOLIC BLOOD PRESSURE: 72 MMHG | BODY MASS INDEX: 30.35 KG/M2 | WEIGHT: 212 LBS | OXYGEN SATURATION: 97 %

## 2021-11-18 DIAGNOSIS — H61.21 IMPACTED CERUMEN OF RIGHT EAR: ICD-10-CM

## 2021-11-18 DIAGNOSIS — Z00.00 ROUTINE GENERAL MEDICAL EXAMINATION AT A HEALTH CARE FACILITY: ICD-10-CM

## 2021-11-18 DIAGNOSIS — E78.2 MIXED HYPERLIPIDEMIA: ICD-10-CM

## 2021-11-18 DIAGNOSIS — R73.9 HYPERGLYCEMIA: ICD-10-CM

## 2021-11-18 DIAGNOSIS — Z00.00 MEDICARE ANNUAL WELLNESS VISIT, SUBSEQUENT: Primary | ICD-10-CM

## 2021-11-18 DIAGNOSIS — J44.1 CHRONIC OBSTRUCTIVE PULMONARY DISEASE WITH ACUTE EXACERBATION (HCC): ICD-10-CM

## 2021-11-18 DIAGNOSIS — Z12.5 SCREENING FOR PROSTATE CANCER: ICD-10-CM

## 2021-11-18 DIAGNOSIS — Z87.898 HISTORY OF ELEVATED PSA: ICD-10-CM

## 2021-11-18 PROCEDURE — G0439 PPPS, SUBSEQ VISIT: HCPCS | Performed by: INTERNAL MEDICINE

## 2021-11-18 NOTE — PROGRESS NOTES
Chief Complaint   Patient presents with    Medicare AWV     wellness visit       HPI: Patient is here today for Medicare annual illness visit and to follow-up hyperlipidemia history of elevated PSA and COPD. He quit smoking he lost a lot of weight he is doing okay holding stable mood seems stable he always declines any screening medical recommendation. Past Medical History:   Diagnosis Date    Hyperlipidemia     Hypertension        Past Surgical History:   Procedure Laterality Date    TONSILLECTOMY         Family History   Problem Relation Age of Onset    High Blood Pressure Mother     Cancer Mother         supranuclear palsy    High Blood Pressure Father     Heart Disease Father        Social History     Socioeconomic History    Marital status:      Spouse name: Not on file    Number of children: Not on file    Years of education: Not on file    Highest education level: Not on file   Occupational History    Not on file   Tobacco Use    Smoking status: Former Smoker     Packs/day: 1.50     Years: 35.00     Pack years: 52.50     Quit date: 3/4/2019     Years since quittin.7    Smokeless tobacco: Never Used   Substance and Sexual Activity    Alcohol use: Not on file    Drug use: Not on file    Sexual activity: Not on file   Other Topics Concern    Not on file   Social History Narrative    Not on file     Social Determinants of Health     Financial Resource Strain:     Difficulty of Paying Living Expenses: Not on file   Food Insecurity:     Worried About [employer] [employer] in the Last Year: Not on file    [place] of Food in the Last Year: Not on file   Transportation Needs:     Lack of Transportation (Medical): Not on file    Lack of Transportation (Non-Medical):  Not on file   Physical Activity:     Days of Exercise per Week: Not on file    Minutes of Exercise per Session: Not on file   Stress:     Feeling of Stress : Not on file   Social Connections:     Frequency of Communication with Friends and Family: Not on file    Frequency of Social Gatherings with Friends and Family: Not on file    Attends Jehovah's witness Services: Not on file    Active Member of Clubs or Organizations: Not on file    Attends Club or Organization Meetings: Not on file    Marital Status: Not on file   Intimate Partner Violence:     Fear of Current or Ex-Partner: Not on file    Emotionally Abused: Not on file    Physically Abused: Not on file    Sexually Abused: Not on file   Housing Stability:     Unable to Pay for Housing in the Last Year: Not on file    Number of [place] in the Last Year: Not on file    Unstable Housing in the Last Year: Not on file       No Known Allergies    Current Outpatient Medications   Medication Sig Dispense Refill    rosuvastatin (CRESTOR) 10 MG tablet TAKE ONE-HALF TABLET BY MOUTH EVERY NIGHT AT BEDTIME 45 tablet 3    vitamin B-12 (CYANOCOBALAMIN) 1000 MCG tablet Take 1,000 mcg by mouth daily      aspirin 81 MG tablet Take 81 mg by mouth daily       No current facility-administered medications for this visit. Review of Systems   Constitutional: Negative for chills, fatigue and fever. HENT: Positive for hearing loss. Negative for congestion and sinus pressure. Ear fullness   Eyes: Negative for discharge and redness. Respiratory: Negative for cough and shortness of breath. Cardiovascular: Negative for chest pain, palpitations and leg swelling. Gastrointestinal: Negative for abdominal distention and abdominal pain. Genitourinary: Negative for dysuria, frequency and urgency. Musculoskeletal: Negative for arthralgias and back pain. Skin: Negative for rash and wound. Neurological: Negative for dizziness, light-headedness and headaches. Psychiatric/Behavioral: Negative for dysphoric mood and sleep disturbance. The patient is not nervous/anxious.         /72   Pulse 58   Ht 5' 10\" (1.778 m)   Wt 212 lb (96.2 kg)   SpO2 97%   BMI 30.42 kg/m²   BP Readings from Last 7 Encounters:   11/18/21 128/72   11/23/20 116/70   11/19/19 110/60   04/08/19 110/70   03/08/19 120/80   03/05/19 120/70   11/13/18 132/80     Wt Readings from Last 7 Encounters:   11/18/21 212 lb (96.2 kg)   11/23/20 201 lb (91.2 kg)   11/19/19 231 lb (104.8 kg)   04/08/19 233 lb (105.7 kg)   03/08/19 227 lb (103 kg)   03/05/19 226 lb (102.5 kg)   11/13/18 234 lb (106.1 kg)     BMI Readings from Last 7 Encounters:   11/18/21 30.42 kg/m²   11/23/20 28.84 kg/m²   11/19/19 33.15 kg/m²   04/08/19 33.43 kg/m²   03/08/19 32.57 kg/m²   03/05/19 32.43 kg/m²   11/13/18 33.58 kg/m²     Resp Readings from Last 7 Encounters:   No data found for Resp       Physical Exam  Constitutional:       General: He is not in acute distress. Appearance: Normal appearance. He is well-developed. HENT:      Head: Normocephalic. Right Ear: External ear normal. Tympanic membrane is not injected. Left Ear: External ear normal. Tympanic membrane is not injected. Mouth/Throat:      Pharynx: No oropharyngeal exudate. Eyes:      General: No scleral icterus. Conjunctiva/sclera: Conjunctivae normal.   Neck:      Thyroid: No thyroid mass or thyromegaly. Vascular: No carotid bruit. Cardiovascular:      Rate and Rhythm: Normal rate and regular rhythm. Heart sounds: S1 normal and S2 normal. No murmur heard. No S3 or S4 sounds. Pulmonary:      Effort: Pulmonary effort is normal. No respiratory distress. Breath sounds: Normal breath sounds. No wheezing or rales. Comments: Decreased bs   Chest:   Breasts:      Right: No supraclavicular adenopathy. Left: No supraclavicular adenopathy. Abdominal:      General: Bowel sounds are normal. There is no distension. Palpations: Abdomen is soft. There is no mass. Tenderness: There is no abdominal tenderness. Musculoskeletal:      Cervical back: Neck supple.    Lymphadenopathy:      Cervical: No cervical adenopathy. Upper Body:      Right upper body: No supraclavicular adenopathy. Left upper body: No supraclavicular adenopathy. Skin:     Findings: No rash. Comments: Changes from sun   Neurological:      Mental Status: He is alert and oriented to person, place, and time. Cranial Nerves: No cranial nerve deficit. Results for orders placed or performed in visit on 11/11/21   Psa screening   Result Value Ref Range    PSA 3.35 0.00 - 4.00 ng/mL   Lipid Panel   Result Value Ref Range    Cholesterol, Total 173 160 - 199 mg/dL    Triglycerides 109 0 - 149 mg/dL    HDL 36 (L) 55 - 121 mg/dL    LDL Calculated 115 <100 mg/dL   TSH without Reflex   Result Value Ref Range    TSH 1.180 0.270 - 4.200 uIU/mL   Hemoglobin A1C   Result Value Ref Range    Hemoglobin A1C 5.7 4.0 - 6.0 %   Comprehensive Metabolic Panel   Result Value Ref Range    Sodium 137 136 - 145 mmol/L    Potassium 4.2 3.5 - 5.0 mmol/L    Chloride 101 98 - 111 mmol/L    CO2 25 22 - 29 mmol/L    Anion Gap 11 7 - 19 mmol/L    Glucose 98 74 - 109 mg/dL    BUN 22 8 - 23 mg/dL    CREATININE 0.9 0.5 - 1.2 mg/dL    GFR Non-African American >60 >60    GFR African American >59 >59    Calcium 9.7 8.8 - 10.2 mg/dL    Total Protein 7.3 6.6 - 8.7 g/dL    Albumin 4.3 3.5 - 5.2 g/dL    Total Bilirubin 0.8 0.2 - 1.2 mg/dL    Alkaline Phosphatase 65 40 - 130 U/L    ALT 23 5 - 41 U/L    AST 33 5 - 40 U/L   CBC   Result Value Ref Range    WBC 8.0 4.8 - 10.8 K/uL    RBC 4.80 4.70 - 6.10 M/uL    Hemoglobin 15.0 14.0 - 18.0 g/dL    Hematocrit 45.3 42.0 - 52.0 %    MCV 94.4 (H) 80.0 - 94.0 fL    MCH 31.3 (H) 27.0 - 31.0 pg    MCHC 33.1 33.0 - 37.0 g/dL    RDW 12.2 11.5 - 14.5 %    Platelets 094 026 - 592 K/uL    MPV 10.5 9.4 - 12.4 fL       ASSESSMENT/ PLAN:  1. Medicare annual wellness visit, subsequent  Chart, medications, labs, vaccines reviewed. Keep up to date with routine care and follow up. Call with any problems or complaints.   Keep up to date with routine screening recomendations and vaccines. 2. Mixed hyperlipidemia  Continue crestor and f/u continue with smoking cessation  - CBC; Future  - Comprehensive Metabolic Panel; Future  - TSH without Reflex; Future  - Lipid Panel; Future    3. Screening for prostate cancer    - PSA screening; Future    4. Hyperglycemia  Healthy low-carb diet follow  - Hemoglobin A1C; Future    5. Impacted cerumen of right ear  Ear flush today  - Ear wax removal    6. Chronic obstructive pulmonary disease with acute exacerbation ([facility] [address]  He declines CT screening continue to stay off cigarettes    7. History of elevated PSA  Follow declines seeing urology                    Medicare Annual Wellness Visit  Name: Garcia Torres Date: 2021   MRN: [id number] Sex: Male   Age: 67 y.o. Ethnicity: Non- / Non    : 1945 Race: White (non-)      Dylon Purcell is here for Medicare AWV (wellness visit)    Screenings for behavioral, psychosocial and functional/safety risks, and cognitive dysfunction are all negative except as indicated below. These results, as well as other patient data from the [address] form, are documented in Flowsheets linked to this Encounter. No Known Allergies    Prior to Visit Medications    Medication Sig Taking?  Authorizing Provider   rosuvastatin (CRESTOR) 10 MG tablet TAKE ONE-HALF TABLET BY MOUTH EVERY NIGHT AT BEDTIME Yes Bong Montejo MD   vitamin B-12 (CYANOCOBALAMIN) 1000 MCG tablet Take 1,000 mcg by mouth daily Yes Historical Provider, MD   aspirin 81 MG tablet Take 81 mg by mouth daily Yes Historical Provider, MD       Past Medical History:   Diagnosis Date    Hyperlipidemia     Hypertension        Past Surgical History:   Procedure Laterality Date    TONSILLECTOMY         Family History   Problem Relation Age of Onset    High Blood Pressure Mother     Cancer Mother         supranuclear palsy    High Blood Pressure Father     Heart Disease Father CareTeam (Including outside providers/suppliers regularly involved in providing care):   Patient Care Team:  Abida Egan MD as PCP - General (Internal Medicine)  Abida Egan MD as PCP [facility]    Wt Readings from Last 3 Encounters:   11/18/21 212 lb (96.2 kg)   11/23/20 201 lb (91.2 kg)   11/19/19 231 lb (104.8 kg)     Vitals:    11/18/21 1015   BP: 128/72   Pulse: 58   SpO2: 97%   Weight: 212 lb (96.2 kg)   Height: 5' 10\" (1.778 m)     Body mass index is 30.42 kg/m². Based upon direct observation of the patient, evaluation of cognition reveals no issues    Patient's complete Health Risk Assessment and screening values have been reviewed and are found in Flowsheets. The following problems were reviewed today and where indicated follow up appointments were made and/or referrals ordered. Positive Risk Factor Screenings with Interventions:          General Health and ACP:  General  In general, how would you say your health is?: Very Good  In the past 7 days, have you experienced any of the following?  New or Increased Pain, New or Increased Fatigue, Loneliness, Social Isolation, Stress or Anger?: None of These  Do you get the social and emotional support that you need?: Yes  Do you have a Living Will?: Yes  Advance Directives     Power of [facility] Will ACP-Advance Directive ACP-Power of     Not on File Not on File Not on File Not on File      General Health Risk Interventions:  · Pt has a living will     Health Habits/Nutrition:  Health Habits/Nutrition  Do you exercise for at least 20 minutes 2-3 times per week?: Yes  Have you lost any weight without trying in the past 3 months?: No  Do you eat only one meal per day?: No  Have you seen the dentist within the past year?: Yes  Body mass index: (!) 30.42  Health Habits/Nutrition Interventions:  · Healthy diet and exercise       Personalized Preventive Plan   Current Health Maintenance Status  Immunization History Administered Date(s) Administered    COVID-19, J&J, PF, 0.5 mL 10/01/2021        Health Maintenance   Topic Date Due    Annual Wellness Visit (AWV)  11/24/2021    COVID-19 Vaccine (2 - Booster for Anurag series) 12/08/2021 (Originally 11/26/2021)    Flu vaccine (1) 11/18/2022 (Originally 9/1/2021)    Pneumococcal 65+ years Vaccine (1 of 1 - PPSV23) 12/01/2022 (Originally 9/6/2009)    DTaP/Tdap/Td vaccine (1 - Tdap) 12/08/2022 (Originally 9[birth year]6/1964)    Shingles Vaccine (1 of 2) 12/08/2022 (Originally 9/6/1994)    Lipid screen  11/11/2022    Hepatitis A vaccine  Aged Out    Hepatitis B vaccine  Aged Out    Hib vaccine  Aged Out    Meningococcal (ACWY) vaccine  Aged Out    Hepatitis C screen  Discontinued    Low dose CT lung screening  Discontinued     Recommendations for Preventive Services Due: see orders and patient instructions/AVS.  . Recommended screening schedule for the next 5-10 years is provided to the patient in written form: see Patient Instructions/AVS.    Jenifer Marie was seen today for medicare awv. Diagnoses and all orders for this visit:    Medicare annual wellness visit, subsequent    Mixed hyperlipidemia  -     CBC; Future  -     Comprehensive Metabolic Panel; Future  -     TSH without Reflex; Future  -     Lipid Panel; Future    Screening for prostate cancer  -     PSA screening;  Future    Hyperglycemia  -     Hemoglobin A1C; Future    Impacted cerumen of right ear  -     Ear wax removal    Chronic obstructive pulmonary disease with acute exacerbation (HCC)    History of elevated PSA

## 2021-12-08 ASSESSMENT — ENCOUNTER SYMPTOMS
SHORTNESS OF BREATH: 0
SINUS PRESSURE: 0
ABDOMINAL PAIN: 0
ABDOMINAL DISTENTION: 0
EYE REDNESS: 0
COUGH: 0
BACK PAIN: 0
EYE DISCHARGE: 0

## 2021-12-08 NOTE — PATIENT INSTRUCTIONS
Personalized Preventive Plan for Graham Mccollum - 11/18/2021  Medicare offers a range of preventive health benefits. Some of the tests and screenings are paid in full while other may be subject to a deductible, co-insurance, and/or copay. Some of these benefits include a comprehensive review of your medical history including lifestyle, illnesses that may run in your family, and various assessments and screenings as appropriate. After reviewing your medical record and screening and assessments performed today your provider may have ordered immunizations, labs, imaging, and/or referrals for you. A list of these orders (if applicable) as well as your Preventive Care list are included within your After Visit Summary for your review. Other Preventive Recommendations:    · A preventive eye exam performed by an eye specialist is recommended every 1-2 years to screen for glaucoma; cataracts, macular degeneration, and other eye disorders. · A preventive dental visit is recommended every 6 months. · Try to get at least 150 minutes of exercise per week or 10,000 steps per day on a pedometer . · Order or download the FREE \"Exercise & Physical Activity: Your Everyday Guide\" from [employer]. Call 0-463.427.2102 or search [employer] online. · You need 1682-6997 mg of calcium and 4207-6922 IU of vitamin D per day. It is possible to meet your calcium requirement with diet alone, but a vitamin D supplement is usually necessary to meet this goal.  · When exposed to the sun, use a sunscreen that protects against both UVA and UVB radiation with an SPF of 30 or greater. Reapply every 2 to 3 hours or after sweating, drying off with a towel, or swimming. · Always wear a seat belt when traveling in a car. Always wear a helmet when riding a bicycle or motorcycle.

## 2022-04-28 RX ORDER — ROSUVASTATIN CALCIUM 10 MG/1
TABLET, COATED ORAL
Qty: 45 TABLET | Refills: 3 | Status: SHIPPED | OUTPATIENT
Start: 2022-04-28

## 2022-11-11 DIAGNOSIS — E78.2 MIXED HYPERLIPIDEMIA: ICD-10-CM

## 2022-11-11 DIAGNOSIS — Z12.5 SCREENING FOR PROSTATE CANCER: ICD-10-CM

## 2022-11-11 DIAGNOSIS — R73.9 HYPERGLYCEMIA: ICD-10-CM

## 2022-11-11 LAB
ALBUMIN SERPL-MCNC: 4.8 G/DL (ref 3.5–5.2)
ALP BLD-CCNC: 59 U/L (ref 40–130)
ALT SERPL-CCNC: 16 U/L (ref 5–41)
ANION GAP SERPL CALCULATED.3IONS-SCNC: 11 MMOL/L (ref 7–19)
AST SERPL-CCNC: 20 U/L (ref 5–40)
BILIRUB SERPL-MCNC: 0.8 MG/DL (ref 0.2–1.2)
BUN BLDV-MCNC: 24 MG/DL (ref 8–23)
CALCIUM SERPL-MCNC: 9.6 MG/DL (ref 8.8–10.2)
CHLORIDE BLD-SCNC: 103 MMOL/L (ref 98–111)
CHOLESTEROL, TOTAL: 164 MG/DL (ref 160–199)
CO2: 25 MMOL/L (ref 22–29)
CREAT SERPL-MCNC: 1 MG/DL (ref 0.5–1.2)
GFR SERPL CREATININE-BSD FRML MDRD: >60 ML/MIN/{1.73_M2}
GLUCOSE BLD-MCNC: 107 MG/DL (ref 74–109)
HBA1C MFR BLD: 5.9 % (ref 4–6)
HCT VFR BLD CALC: 47.3 % (ref 42–52)
HDLC SERPL-MCNC: 34 MG/DL (ref 55–121)
HEMOGLOBIN: 15.1 G/DL (ref 14–18)
LDL CHOLESTEROL CALCULATED: 108 MG/DL
MCH RBC QN AUTO: 30.6 PG (ref 27–31)
MCHC RBC AUTO-ENTMCNC: 31.9 G/DL (ref 33–37)
MCV RBC AUTO: 95.9 FL (ref 80–94)
PDW BLD-RTO: 11.8 % (ref 11.5–14.5)
PLATELET # BLD: 246 K/UL (ref 130–400)
PMV BLD AUTO: 11 FL (ref 9.4–12.4)
POTASSIUM SERPL-SCNC: 5 MMOL/L (ref 3.5–5)
PROSTATE SPECIFIC ANTIGEN: 6.03 NG/ML (ref 0–4)
RBC # BLD: 4.93 M/UL (ref 4.7–6.1)
SODIUM BLD-SCNC: 139 MMOL/L (ref 136–145)
TOTAL PROTEIN: 7.1 G/DL (ref 6.6–8.7)
TRIGL SERPL-MCNC: 112 MG/DL (ref 0–149)
TSH SERPL DL<=0.05 MIU/L-ACNC: 1.4 UIU/ML (ref 0.27–4.2)
WBC # BLD: 7.7 K/UL (ref 4.8–10.8)

## 2022-11-18 ENCOUNTER — OFFICE VISIT (OUTPATIENT)
Dept: INTERNAL MEDICINE | Age: 77
End: 2022-11-18
Payer: MEDICARE

## 2022-11-18 VITALS
OXYGEN SATURATION: 95 % | SYSTOLIC BLOOD PRESSURE: 110 MMHG | BODY MASS INDEX: 31.92 KG/M2 | DIASTOLIC BLOOD PRESSURE: 68 MMHG | WEIGHT: 223 LBS | HEART RATE: 60 BPM | HEIGHT: 70 IN

## 2022-11-18 DIAGNOSIS — J44.1 CHRONIC OBSTRUCTIVE PULMONARY DISEASE WITH ACUTE EXACERBATION (HCC): ICD-10-CM

## 2022-11-18 DIAGNOSIS — H40.40X0 GLAUCOMA ASSOCIATED WITH OCULAR INFLAMMATION, UNSPECIFIED GLAUCOMA STAGE, UNSPECIFIED LATERALITY: ICD-10-CM

## 2022-11-18 DIAGNOSIS — Z23 INFLUENZA VACCINE NEEDED: ICD-10-CM

## 2022-11-18 DIAGNOSIS — Z87.898 HISTORY OF ELEVATED PSA: ICD-10-CM

## 2022-11-18 DIAGNOSIS — R97.20 ELEVATED PROSTATE SPECIFIC ANTIGEN (PSA): ICD-10-CM

## 2022-11-18 DIAGNOSIS — I10 PRIMARY HYPERTENSION: ICD-10-CM

## 2022-11-18 DIAGNOSIS — Z00.00 MEDICARE ANNUAL WELLNESS VISIT, SUBSEQUENT: Primary | ICD-10-CM

## 2022-11-18 DIAGNOSIS — E78.2 MIXED HYPERLIPIDEMIA: ICD-10-CM

## 2022-11-18 DIAGNOSIS — H61.23 BILATERAL IMPACTED CERUMEN: ICD-10-CM

## 2022-11-18 PROCEDURE — 3078F DIAST BP <80 MM HG: CPT | Performed by: INTERNAL MEDICINE

## 2022-11-18 PROCEDURE — 1123F ACP DISCUSS/DSCN MKR DOCD: CPT | Performed by: INTERNAL MEDICINE

## 2022-11-18 PROCEDURE — G0439 PPPS, SUBSEQ VISIT: HCPCS | Performed by: INTERNAL MEDICINE

## 2022-11-18 PROCEDURE — G0008 ADMIN INFLUENZA VIRUS VAC: HCPCS | Performed by: INTERNAL MEDICINE

## 2022-11-18 PROCEDURE — 90694 VACC AIIV4 NO PRSRV 0.5ML IM: CPT | Performed by: INTERNAL MEDICINE

## 2022-11-18 PROCEDURE — 3074F SYST BP LT 130 MM HG: CPT | Performed by: INTERNAL MEDICINE

## 2022-11-18 RX ORDER — TIMOLOL MALEATE 5 MG/ML
SOLUTION/ DROPS OPHTHALMIC
COMMUNITY
Start: 2022-09-20

## 2022-11-18 SDOH — ECONOMIC STABILITY: FOOD INSECURITY: WITHIN THE PAST 12 MONTHS, THE FOOD YOU BOUGHT JUST DIDN'T LAST AND YOU DIDN'T HAVE MONEY TO GET MORE.: NEVER TRUE

## 2022-11-18 SDOH — ECONOMIC STABILITY: FOOD INSECURITY: WITHIN THE PAST 12 MONTHS, YOU WORRIED THAT YOUR FOOD WOULD RUN OUT BEFORE YOU GOT MONEY TO BUY MORE.: NEVER TRUE

## 2022-11-18 ASSESSMENT — LIFESTYLE VARIABLES
HOW OFTEN DO YOU HAVE A DRINK CONTAINING ALCOHOL: MONTHLY OR LESS
HOW MANY STANDARD DRINKS CONTAINING ALCOHOL DO YOU HAVE ON A TYPICAL DAY: 1 OR 2

## 2022-11-18 ASSESSMENT — PATIENT HEALTH QUESTIONNAIRE - PHQ9
SUM OF ALL RESPONSES TO PHQ QUESTIONS 1-9: 0
1. LITTLE INTEREST OR PLEASURE IN DOING THINGS: 0
SUM OF ALL RESPONSES TO PHQ QUESTIONS 1-9: 0
SUM OF ALL RESPONSES TO PHQ9 QUESTIONS 1 & 2: 0
2. FEELING DOWN, DEPRESSED OR HOPELESS: 0

## 2022-11-18 ASSESSMENT — SOCIAL DETERMINANTS OF HEALTH (SDOH): HOW HARD IS IT FOR YOU TO PAY FOR THE VERY BASICS LIKE FOOD, HOUSING, MEDICAL CARE, AND HEATING?: NOT VERY HARD

## 2022-11-18 NOTE — PROGRESS NOTES
Refill    nitrofurantoin, macrocrystal-monohydrate, (MACROBID) 100 MG capsule Take 1 capsule by mouth 2 times daily for 7 days 14 capsule 0    timolol (TIMOPTIC) 0.5 % ophthalmic solution       rosuvastatin (CRESTOR) 10 MG tablet TAKE 1/2 TABLET BY MOUTH EVERY NIGHT AT BEDTIME 45 tablet 3    vitamin B-12 (CYANOCOBALAMIN) 1000 MCG tablet Take 1,000 mcg by mouth daily      aspirin 81 MG tablet Take 81 mg by mouth daily       No current facility-administered medications for this visit. Review of Systems   Constitutional:  Negative for chills and fever. HENT:  Negative for congestion and sinus pressure. Eyes:  Negative for discharge and redness. Respiratory:  Negative for cough and shortness of breath. Cardiovascular:  Negative for chest pain, palpitations and leg swelling. Gastrointestinal:  Negative for abdominal distention and abdominal pain. Genitourinary:  Negative for dysuria, frequency and urgency. Musculoskeletal:  Negative for arthralgias and back pain. Skin:  Negative for rash and wound. Neurological:  Negative for dizziness, light-headedness and headaches. Psychiatric/Behavioral:  Negative for dysphoric mood and sleep disturbance. The patient is not nervous/anxious.       /68   Pulse 60   Ht 5' 10\" (1.778 m)   Wt 223 lb (101.2 kg)   SpO2 95%   BMI 32.00 kg/m²   BP Readings from Last 7 Encounters:   11/18/22 110/68   11/18/21 128/72   11/23/20 116/70   11/19/19 110/60   04/08/19 110/70   03/08/19 120/80   03/05/19 120/70     Wt Readings from Last 7 Encounters:   11/18/22 223 lb (101.2 kg)   11/18/21 212 lb (96.2 kg)   11/23/20 201 lb (91.2 kg)   11/19/19 231 lb (104.8 kg)   04/08/19 233 lb (105.7 kg)   03/08/19 227 lb (103 kg)   03/05/19 226 lb (102.5 kg)     BMI Readings from Last 7 Encounters:   11/18/22 32.00 kg/m²   11/18/21 30.42 kg/m²   11/23/20 28.84 kg/m²   11/19/19 33.15 kg/m²   04/08/19 33.43 kg/m²   03/08/19 32.57 kg/m²   03/05/19 32.43 kg/m²     Resp Readings from Last 7 Encounters:   No data found for Resp       Physical Exam  Constitutional:       General: He is not in acute distress. Appearance: Normal appearance. He is well-developed. HENT:      Right Ear: External ear normal. Tympanic membrane is not injected. Left Ear: External ear normal. Tympanic membrane is not injected. Mouth/Throat:      Pharynx: No oropharyngeal exudate. Eyes:      General: No scleral icterus. Conjunctiva/sclera: Conjunctivae normal.   Neck:      Thyroid: No thyroid mass or thyromegaly. Vascular: No carotid bruit. Cardiovascular:      Rate and Rhythm: Normal rate and regular rhythm. Heart sounds: S1 normal and S2 normal. No murmur heard. No S3 or S4 sounds. Pulmonary:      Effort: Pulmonary effort is normal. No respiratory distress. Breath sounds: Normal breath sounds. No wheezing or rales. Abdominal:      General: Bowel sounds are normal. There is no distension. Palpations: Abdomen is soft. There is no mass. Tenderness: There is no abdominal tenderness. Musculoskeletal:      Cervical back: Neck supple. Lymphadenopathy:      Cervical: No cervical adenopathy. Upper Body:      Right upper body: No supraclavicular adenopathy. Left upper body: No supraclavicular adenopathy. Skin:     Findings: No rash. Comments: Skin lesions   Neurological:      Mental Status: He is alert and oriented to person, place, and time. Cranial Nerves: No cranial nerve deficit.        Results for orders placed or performed in visit on 11/11/22   PSA screening   Result Value Ref Range    PSA 6.03 (H) 0.00 - 4.00 ng/mL   Lipid Panel   Result Value Ref Range    Cholesterol, Total 164 160 - 199 mg/dL    Triglycerides 112 0 - 149 mg/dL    HDL 34 (L) 55 - 121 mg/dL    LDL Calculated 108 <100 mg/dL   TSH without Reflex   Result Value Ref Range    TSH 1.400 0.270 - 4.200 uIU/mL   Hemoglobin A1C   Result Value Ref Range    Hemoglobin A1C 5.9 4.0 - 6.0 %   Comprehensive Metabolic Panel   Result Value Ref Range    Sodium 139 136 - 145 mmol/L    Potassium 5.0 3.5 - 5.0 mmol/L    Chloride 103 98 - 111 mmol/L    CO2 25 22 - 29 mmol/L    Anion Gap 11 7 - 19 mmol/L    Glucose 107 74 - 109 mg/dL    BUN 24 (H) 8 - 23 mg/dL    Creatinine 1.0 0.5 - 1.2 mg/dL    Est, Glom Filt Rate >60 >60    Calcium 9.6 8.8 - 10.2 mg/dL    Total Protein 7.1 6.6 - 8.7 g/dL    Albumin 4.8 3.5 - 5.2 g/dL    Total Bilirubin 0.8 0.2 - 1.2 mg/dL    Alkaline Phosphatase 59 40 - 130 U/L    ALT 16 5 - 41 U/L    AST 20 5 - 40 U/L   CBC   Result Value Ref Range    WBC 7.7 4.8 - 10.8 K/uL    RBC 4.93 4.70 - 6.10 M/uL    Hemoglobin 15.1 14.0 - 18.0 g/dL    Hematocrit 47.3 42.0 - 52.0 %    MCV 95.9 (H) 80.0 - 94.0 fL    MCH 30.6 27.0 - 31.0 pg    MCHC 31.9 (L) 33.0 - 37.0 g/dL    RDW 11.8 11.5 - 14.5 %    Platelets 079 551 - 577 K/uL    MPV 11.0 9.4 - 12.4 fL       ASSESSMENT/ PLAN:  1. Medicare annual wellness visit, subsequent  Chart, medications, labs, vaccines reviewed. Keep up to date with routine care and follow up. Call with any problems or complaints. Keep up to date with routine screening recomendations and vaccines. We encourage him to stay off cigarettes  recheck in 3 months  2. Elevated prostate specific antigen (PSA)  PSA came back later and it was elevated we offered to refer him to urology he declines on that so we did recommend we  - PSA, Total and Free; Future  - PSA, Total and Free; Future    3. Bilateral impacted cerumen  Bilateral cerumen removal  - Ear wax removal    4. Influenza vaccine needed    - Influenza, FLUAD, (age 72 y+), IM, Preservative Free, 0.5 mL    5. Primary hypertension  Blood pressure in good control continue current care    6. Mixed hyperlipidemia   continue to follow    7. History of elevated PSA  As above we offer and recommend referral to urology but he prefers for us to reassess    8.  Chronic obstructive pulmonary disease with acute exacerbation [facility])  Most important thing was smoking cessation continue to follow    9.  Glaucoma associated with ocular inflammation, unspecified glaucoma stage, unspecified laterality  Keep follow-up with ophthalmology

## 2022-11-21 LAB
PROSTATE SPECIFIC ANTIGEN FREE: 2.5 NG/ML
PROSTATE SPECIFIC ANTIGEN PERCENT FREE: 27 %
PROSTATE SPECIFIC ANTIGEN: 9.2 NG/ML (ref 0–4)

## 2022-12-05 ENCOUNTER — TELEPHONE (OUTPATIENT)
Dept: INTERNAL MEDICINE | Age: 77
End: 2022-12-05

## 2022-12-08 ASSESSMENT — ENCOUNTER SYMPTOMS
EYE REDNESS: 0
COUGH: 0
EYE DISCHARGE: 0
SINUS PRESSURE: 0
ABDOMINAL DISTENTION: 0
SHORTNESS OF BREATH: 0
BACK PAIN: 0
ABDOMINAL PAIN: 0

## 2022-12-09 NOTE — PROGRESS NOTES
Medicare Annual Wellness Visit    Ramonita Cortes is here for Medicare AWV    Assessment & Plan   Medicare annual wellness visit, subsequent  Elevated prostate specific antigen (PSA)  -     PSA, Total and Free; Future  -     PSA, Total and Free; Future  Bilateral impacted cerumen  -     Ear wax removal  Influenza vaccine needed  -     Influenza, FLUAD, (age 72 y+), IM, Preservative Free, 0.5 mL  Primary hypertension  Mixed hyperlipidemia  History of elevated PSA  Chronic obstructive pulmonary disease with acute exacerbation (HCC)  Glaucoma associated with ocular inflammation, unspecified glaucoma stage, unspecified laterality    Recommendations for Preventive Services Due: see orders and patient instructions/AVS.  Recommended screening schedule for the next 5-10 years is provided to the patient in written form: see Patient Instructions/AVS.     Return in about 1 year (around 11/18/2023) for medicare annual wellness. Subjective   The following acute and/or chronic problems were also addressed today:  See attached note    Patient's complete Health Risk Assessment and screening values have been reviewed and are found in Flowsheets. The following problems were reviewed today and where indicated follow up appointments were made and/or referrals ordered. Positive Risk Factor Screenings with Interventions:                 Weight and Activity:  Physical Activity: Sufficiently Active    Days of Exercise per Week: 7 days    Minutes of Exercise per Session: 60 min     On average, how many days per week do you engage in moderate to strenuous exercise (like a brisk walk)?: 7 days  Have you lost any weight without trying in the past 3 months?: No  Body mass index: (!) 31.99      Unintentional Weight Loss Interventions:   Work on healthy diet  Obesity Interventions:  See AVS for additional education material  See A/P for plan and any pertinent orders                               Objective   Vitals:    11/18/22 1007   BP: 110/68   Pulse: 60   SpO2: 95%   Weight: 223 lb (101.2 kg)   Height: 5' 10\" (1.778 m)      Body mass index is 32 kg/m². Physical Exam        No Known Allergies  Prior to Visit Medications    Medication Sig Taking?  Authorizing Provider   nitrofurantoin, macrocrystal-monohydrate, (MACROBID) 100 MG capsule Take 1 capsule by mouth 2 times daily for 7 days  Sascha Wills MD   timolol (TIMOPTIC) 0.5 % ophthalmic solution   Historical Provider, MD   rosuvastatin (CRESTOR) 10 MG tablet TAKE 1/2 TABLET BY MOUTH EVERY NIGHT AT BEDTIME  Sascha Wills MD   vitamin B-12 (CYANOCOBALAMIN) 1000 MCG tablet Take 1,000 mcg by mouth daily  Historical Provider, MD   aspirin 81 MG tablet Take 81 mg by mouth daily  Historical Provider, MD [place] (Including outside providers/suppliers regularly involved in providing care):   Patient Care Team:  Sascha Wills MD as PCP - General (Internal Medicine)  Sascha Wills MD as PCP - [facility] Empaneled Provider     Reviewed and updated this visit:  Allergies  Meds  Problems

## 2022-12-09 NOTE — PATIENT INSTRUCTIONS
Advance Directives: Care Instructions  Overview  An advance directive is a legal way to state your wishes at the end of your life. It tells your family and your doctor what to do if you can't say what you want. There are two main types of advance directives. You can change them any time your wishes change. Living will. This form tells your family and your doctor your wishes about life support and other treatment. The form is also called a declaration. Medical power of . This form lets you name a person to make treatment decisions for you when you can't speak for yourself. This person is called a health care agent (health care proxy, health care surrogate). The form is also called a durable power of  for health care. If you do not have an advance directive, decisions about your medical care may be made by a family member, or by a doctor or a  who doesn't know you. It may help to think of an advance directive as a gift to the people who care for you. If you have one, they won't have to make tough decisions by themselves. For more information, including forms for your state, see the [address] website (www.caringinfo.org/planning/advance-directives/). Follow-up care is a key part of your treatment and safety. Be sure to make and go to all appointments, and call your doctor if you are having problems. It's also a good idea to know your test results and keep a list of the medicines you take. What should you include in an advance directive? Many states have a unique advance directive form. (It may ask you to address specific issues.) Or you might use a universal form that's approved by many states. If your form doesn't tell you what to address, it may be hard to know what to include in your advance directive. Use the questions below to help you get started. Who do you want to make decisions about your medical care if you are not able to?   What life-support measures do you want if you have a serious illness that gets worse over time or can't be cured? What are you most afraid of that might happen? (Maybe you're afraid of having pain, losing your independence, or being kept alive by machines.)  Where would you prefer to die? (Your home? A hospital? A nursing home?)  Do you want to donate your organs when you die? Do you want certain Confucianism practices performed before you die? When should you call for help? Be sure to contact your doctor if you have any questions. Where can you learn more? Go to http://www.easley.com/ and enter R264 to learn more about \"Advance Directives: Care Instructions. \"  Current as of: June 16, 2022               Content Version: 13.5  © 7983-2267 [employer]. Care instructions adapted under license by [facility] ([facility]). If you have questions about a medical condition or this instruction, always ask your healthcare professional. [place] [address] any warranty or liability for your use of this information. Personalized Preventive Plan for [facility] - 11/18/2022  Medicare offers a range of preventive health benefits. Some of the tests and screenings are paid in full while other may be subject to a deductible, co-insurance, and/or copay. Some of these benefits include a comprehensive review of your medical history including lifestyle, illnesses that may run in your family, and various assessments and screenings as appropriate. After reviewing your medical record and screening and assessments performed today your provider may have ordered immunizations, labs, imaging, and/or referrals for you. A list of these orders (if applicable) as well as your Preventive Care list are included within your After Visit Summary for your review.     Other Preventive Recommendations:    A preventive eye exam performed by an eye specialist is recommended every 1-2 years to screen for glaucoma; cataracts, macular degeneration, and other eye disorders. A preventive dental visit is recommended every 6 months. Try to get at least 150 minutes of exercise per week or 10,000 steps per day on a pedometer . Order or download the FREE \"Exercise & Physical Activity: Your Everyday Guide\" from The [employer]. Call 3-686.368.4453 or search [employer] online. You need 3892-3785 mg of calcium and 9848-1466 IU of vitamin D per day. It is possible to meet your calcium requirement with diet alone, but a vitamin D supplement is usually necessary to meet this goal.  When exposed to the sun, use a sunscreen that protects against both UVA and UVB radiation with an SPF of 30 or greater. Reapply every 2 to 3 hours or after sweating, drying off with a towel, or swimming. Always wear a seat belt when traveling in a car. Always wear a helmet when riding a bicycle or motorcycle.

## 2023-03-24 DIAGNOSIS — R97.20 ELEVATED PROSTATE SPECIFIC ANTIGEN (PSA): ICD-10-CM

## 2023-03-26 LAB
PSA FREE MFR SERPL: 34 %
PSA FREE SERPL-MCNC: 1.1 NG/ML
PSA SERPL-MCNC: 3.2 NG/ML (ref 0–4)

## 2023-11-02 RX ORDER — ROSUVASTATIN CALCIUM 10 MG/1
10 TABLET, COATED ORAL DAILY
Qty: 45 TABLET | Refills: 3 | Status: SHIPPED | OUTPATIENT
Start: 2023-11-02

## 2023-11-13 DIAGNOSIS — I10 PRIMARY HYPERTENSION: ICD-10-CM

## 2023-11-13 DIAGNOSIS — R97.20 ELEVATED PROSTATE SPECIFIC ANTIGEN (PSA): ICD-10-CM

## 2023-11-13 DIAGNOSIS — Z87.898 HISTORY OF ELEVATED PSA: ICD-10-CM

## 2023-11-13 LAB
ALBUMIN SERPL-MCNC: 4.2 G/DL (ref 3.5–5.2)
ALP SERPL-CCNC: 56 U/L (ref 40–130)
ALT SERPL-CCNC: 23 U/L (ref 5–41)
ANION GAP SERPL CALCULATED.3IONS-SCNC: 12 MMOL/L (ref 7–19)
AST SERPL-CCNC: 18 U/L (ref 5–40)
BILIRUB SERPL-MCNC: 0.6 MG/DL (ref 0.2–1.2)
BUN SERPL-MCNC: 17 MG/DL (ref 8–23)
CALCIUM SERPL-MCNC: 9.9 MG/DL (ref 8.8–10.2)
CHLORIDE SERPL-SCNC: 105 MMOL/L (ref 98–111)
CHOLEST SERPL-MCNC: 155 MG/DL (ref 160–199)
CO2 SERPL-SCNC: 27 MMOL/L (ref 22–29)
CREAT SERPL-MCNC: 1 MG/DL (ref 0.5–1.2)
ERYTHROCYTE [DISTWIDTH] IN BLOOD BY AUTOMATED COUNT: 11.9 % (ref 11.5–14.5)
GLUCOSE SERPL-MCNC: 116 MG/DL (ref 74–109)
HCT VFR BLD AUTO: 48.4 % (ref 42–52)
HDLC SERPL-MCNC: 38 MG/DL (ref 55–121)
HGB BLD-MCNC: 16 G/DL (ref 14–18)
LDLC SERPL CALC-MCNC: 95 MG/DL
MCH RBC QN AUTO: 31 PG (ref 27–31)
MCHC RBC AUTO-ENTMCNC: 33.1 G/DL (ref 33–37)
MCV RBC AUTO: 93.8 FL (ref 80–94)
PLATELET # BLD AUTO: 254 K/UL (ref 130–400)
PMV BLD AUTO: 10.1 FL (ref 9.4–12.4)
POTASSIUM SERPL-SCNC: 4.1 MMOL/L (ref 3.5–5)
PROT SERPL-MCNC: 7.5 G/DL (ref 6.6–8.7)
RBC # BLD AUTO: 5.16 M/UL (ref 4.7–6.1)
SODIUM SERPL-SCNC: 144 MMOL/L (ref 136–145)
TRIGL SERPL-MCNC: 108 MG/DL (ref 0–149)
TSH SERPL DL<=0.005 MIU/L-ACNC: 1.24 UIU/ML (ref 0.27–4.2)
WBC # BLD AUTO: 8.2 K/UL (ref 4.8–10.8)

## 2023-11-14 LAB
PSA FREE MFR SERPL: 35 %
PSA FREE SERPL-MCNC: 1.2 NG/ML
PSA SERPL-MCNC: 3.4 NG/ML (ref 0–4)

## 2023-11-17 SDOH — HEALTH STABILITY: PHYSICAL HEALTH: ON AVERAGE, HOW MANY MINUTES DO YOU ENGAGE IN EXERCISE AT THIS LEVEL?: PATIENT DECLINED

## 2023-11-17 SDOH — HEALTH STABILITY: PHYSICAL HEALTH
ON AVERAGE, HOW MANY DAYS PER WEEK DO YOU ENGAGE IN MODERATE TO STRENUOUS EXERCISE (LIKE A BRISK WALK)?: PATIENT DECLINED

## 2023-11-17 ASSESSMENT — PATIENT HEALTH QUESTIONNAIRE - PHQ9
SUM OF ALL RESPONSES TO PHQ QUESTIONS 1-9: 0
SUM OF ALL RESPONSES TO PHQ QUESTIONS 1-9: 0
1. LITTLE INTEREST OR PLEASURE IN DOING THINGS: 0
SUM OF ALL RESPONSES TO PHQ9 QUESTIONS 1 & 2: 0
2. FEELING DOWN, DEPRESSED OR HOPELESS: 0
SUM OF ALL RESPONSES TO PHQ QUESTIONS 1-9: 0
SUM OF ALL RESPONSES TO PHQ QUESTIONS 1-9: 0

## 2023-11-17 ASSESSMENT — LIFESTYLE VARIABLES
HOW OFTEN DO YOU HAVE A DRINK CONTAINING ALCOHOL: NEVER
HOW MANY STANDARD DRINKS CONTAINING ALCOHOL DO YOU HAVE ON A TYPICAL DAY: 0
HOW MANY STANDARD DRINKS CONTAINING ALCOHOL DO YOU HAVE ON A TYPICAL DAY: PATIENT DOES NOT DRINK
HOW OFTEN DO YOU HAVE A DRINK CONTAINING ALCOHOL: 1
HOW OFTEN DO YOU HAVE SIX OR MORE DRINKS ON ONE OCCASION: 1

## 2023-11-20 ENCOUNTER — TELEPHONE (OUTPATIENT)
Dept: INTERNAL MEDICINE | Age: 78
End: 2023-11-20

## 2023-11-20 ENCOUNTER — OFFICE VISIT (OUTPATIENT)
Dept: INTERNAL MEDICINE | Age: 78
End: 2023-11-20
Payer: MEDICARE

## 2023-11-20 VITALS
HEART RATE: 60 BPM | DIASTOLIC BLOOD PRESSURE: 70 MMHG | HEIGHT: 70 IN | OXYGEN SATURATION: 94 % | BODY MASS INDEX: 33.36 KG/M2 | SYSTOLIC BLOOD PRESSURE: 120 MMHG | WEIGHT: 233 LBS

## 2023-11-20 DIAGNOSIS — R73.9 HYPERGLYCEMIA: ICD-10-CM

## 2023-11-20 DIAGNOSIS — E78.2 MIXED HYPERLIPIDEMIA: ICD-10-CM

## 2023-11-20 DIAGNOSIS — H61.23 BILATERAL IMPACTED CERUMEN: ICD-10-CM

## 2023-11-20 DIAGNOSIS — Z12.5 ENCOUNTER FOR SCREENING FOR MALIGNANT NEOPLASM OF PROSTATE: ICD-10-CM

## 2023-11-20 DIAGNOSIS — H40.40X0 GLAUCOMA ASSOCIATED WITH OCULAR INFLAMMATION, UNSPECIFIED GLAUCOMA STAGE, UNSPECIFIED LATERALITY: ICD-10-CM

## 2023-11-20 DIAGNOSIS — I10 PRIMARY HYPERTENSION: ICD-10-CM

## 2023-11-20 DIAGNOSIS — Z87.898 HISTORY OF ELEVATED PSA: ICD-10-CM

## 2023-11-20 DIAGNOSIS — J44.1 CHRONIC OBSTRUCTIVE PULMONARY DISEASE WITH ACUTE EXACERBATION (HCC): ICD-10-CM

## 2023-11-20 DIAGNOSIS — H02.9 EYELID ABNORMALITY: Primary | ICD-10-CM

## 2023-11-20 DIAGNOSIS — Z00.00 MEDICARE ANNUAL WELLNESS VISIT, SUBSEQUENT: Primary | ICD-10-CM

## 2023-11-20 DIAGNOSIS — H02.105 ECTROPION OF LEFT LOWER EYELID, UNSPECIFIED ECTROPION TYPE: ICD-10-CM

## 2023-11-20 PROCEDURE — 1123F ACP DISCUSS/DSCN MKR DOCD: CPT | Performed by: INTERNAL MEDICINE

## 2023-11-20 PROCEDURE — G0439 PPPS, SUBSEQ VISIT: HCPCS | Performed by: INTERNAL MEDICINE

## 2023-11-20 PROCEDURE — G8484 FLU IMMUNIZE NO ADMIN: HCPCS | Performed by: INTERNAL MEDICINE

## 2023-11-20 PROCEDURE — 3074F SYST BP LT 130 MM HG: CPT | Performed by: INTERNAL MEDICINE

## 2023-11-20 PROCEDURE — 3078F DIAST BP <80 MM HG: CPT | Performed by: INTERNAL MEDICINE

## 2023-11-20 RX ORDER — GENTAMICIN SULFATE 3 MG/G
OINTMENT OPHTHALMIC
Qty: 3.5 G | Refills: 1 | Status: SHIPPED | OUTPATIENT
Start: 2023-11-20 | End: 2023-11-30

## 2023-11-20 NOTE — PROGRESS NOTES
Chief Complaint   Patient presents with    Medicare AWV       HPI: Patient is here today for Medicare annual wellness visit and to follow-up hypertension hyperlipidemia COPD. He is remained off cigarettes which is available. He is having a lot of trouble with his eyes he has a left lower lid ectropion. Side appears very inflamed a little red when I asked him to close his eyes the lids do not go together on the left and he has severe watering he says is not that bothersome. Resolved and he denies other specific new complaint has several underlying stressors.     Past Medical History:   Diagnosis Date    Hyperlipidemia     Hypertension        Past Surgical History:   Procedure Laterality Date    TONSILLECTOMY         Family History   Problem Relation Age of Onset    High Blood Pressure Mother     Cancer Mother         supranuclear palsy    High Blood Pressure Father     Heart Disease Father        Social History     Socioeconomic History    Marital status:      Spouse name: Not on file    Number of children: Not on file    Years of education: Not on file    Highest education level: Not on file   Occupational History    Not on file   Tobacco Use    Smoking status: Former     Packs/day: 1.50     Years: 35.00     Additional pack years: 0.00     Total pack years: 52.50     Types: Cigarettes     Quit date: 3/4/2019     Years since quittin.7    Smokeless tobacco: Never   Substance and Sexual Activity    Alcohol use: Not on file    Drug use: Not on file    Sexual activity: Not on file   Other Topics Concern    Not on file   Social History Narrative    Not on file     Social Determinants of Health     Financial Resource Strain: Low Risk  (3/29/2023)    Overall Financial Resource Strain (CARDIA)     Difficulty of Paying Living Expenses: Not hard at all   Food Insecurity: No Food Insecurity (3/29/2023)    Hunger Vital Sign     Worried About Running Out of Food in the Last Year: Never true     [address] in

## 2023-11-24 ASSESSMENT — ENCOUNTER SYMPTOMS
EYE REDNESS: 0
BACK PAIN: 0
ABDOMINAL PAIN: 0
ABDOMINAL DISTENTION: 0
SINUS PRESSURE: 0
COUGH: 0
SHORTNESS OF BREATH: 0
EYE DISCHARGE: 0

## 2023-11-27 ENCOUNTER — TELEPHONE (OUTPATIENT)
Dept: INTERNAL MEDICINE | Age: 78
End: 2023-11-27

## 2023-11-27 RX ORDER — BACITRACIN ZINC AND POLYMYXIN B SULFATE 500; 1000 [USP'U]/G; [USP'U]/G
OINTMENT TOPICAL
Qty: 28.4 G | Refills: 1 | Status: SHIPPED | OUTPATIENT
Start: 2023-11-27 | End: 2023-11-28

## 2024-07-01 RX ORDER — ROSUVASTATIN CALCIUM 10 MG/1
10 TABLET, COATED ORAL DAILY
Qty: 45 TABLET | Refills: 3 | Status: SHIPPED | OUTPATIENT
Start: 2024-07-01

## 2024-09-16 RX ORDER — METHYLPREDNISOLONE 4 MG
TABLET, DOSE PACK ORAL
Qty: 1 KIT | Refills: 0 | Status: SHIPPED | OUTPATIENT
Start: 2024-09-16 | End: 2024-09-22

## 2024-09-16 RX ORDER — AZITHROMYCIN 250 MG/1
TABLET, FILM COATED ORAL
Qty: 6 TABLET | Refills: 0 | Status: SHIPPED | OUTPATIENT
Start: 2024-09-16 | End: 2024-09-26

## 2024-09-16 RX ORDER — ALBUTEROL SULFATE 90 UG/1
2 INHALANT RESPIRATORY (INHALATION) 4 TIMES DAILY PRN
Qty: 18 G | Refills: 1 | Status: SHIPPED | OUTPATIENT
Start: 2024-09-16

## 2024-11-14 DIAGNOSIS — Z12.5 ENCOUNTER FOR SCREENING FOR MALIGNANT NEOPLASM OF PROSTATE: ICD-10-CM

## 2024-11-14 DIAGNOSIS — R73.9 HYPERGLYCEMIA: ICD-10-CM

## 2024-11-14 DIAGNOSIS — E78.2 MIXED HYPERLIPIDEMIA: ICD-10-CM

## 2024-11-14 DIAGNOSIS — I10 PRIMARY HYPERTENSION: ICD-10-CM

## 2024-11-14 LAB
ALBUMIN SERPL-MCNC: 4.1 G/DL (ref 3.5–5.2)
ALP SERPL-CCNC: 62 U/L (ref 40–129)
ALT SERPL-CCNC: 15 U/L (ref 5–41)
ANION GAP SERPL CALCULATED.3IONS-SCNC: 11 MMOL/L (ref 7–19)
AST SERPL-CCNC: 15 U/L (ref 5–40)
BILIRUB SERPL-MCNC: 0.9 MG/DL (ref 0.2–1.2)
BUN SERPL-MCNC: 19 MG/DL (ref 8–23)
CALCIUM SERPL-MCNC: 9.7 MG/DL (ref 8.8–10.2)
CHLORIDE SERPL-SCNC: 104 MMOL/L (ref 98–111)
CHOLEST SERPL-MCNC: 184 MG/DL (ref 0–199)
CO2 SERPL-SCNC: 27 MMOL/L (ref 22–29)
CREAT SERPL-MCNC: 1 MG/DL (ref 0.7–1.2)
ERYTHROCYTE [DISTWIDTH] IN BLOOD BY AUTOMATED COUNT: 12.4 % (ref 11.5–14.5)
GLUCOSE SERPL-MCNC: 101 MG/DL (ref 70–99)
HBA1C MFR BLD: 6 % (ref 4–5.6)
HCT VFR BLD AUTO: 50.1 % (ref 42–52)
HDLC SERPL-MCNC: 37 MG/DL (ref 40–60)
HGB BLD-MCNC: 15.7 G/DL (ref 14–18)
LDLC SERPL CALC-MCNC: 127 MG/DL
MCH RBC QN AUTO: 30.3 PG (ref 27–31)
MCHC RBC AUTO-ENTMCNC: 31.3 G/DL (ref 33–37)
MCV RBC AUTO: 96.5 FL (ref 80–94)
PLATELET # BLD AUTO: 240 K/UL (ref 130–400)
PMV BLD AUTO: 9.7 FL (ref 9.4–12.4)
POTASSIUM SERPL-SCNC: 4.3 MMOL/L (ref 3.5–5)
PROT SERPL-MCNC: 7.2 G/DL (ref 6.4–8.3)
RBC # BLD AUTO: 5.19 M/UL (ref 4.7–6.1)
SODIUM SERPL-SCNC: 142 MMOL/L (ref 136–145)
TRIGL SERPL-MCNC: 101 MG/DL (ref 0–149)
TSH SERPL DL<=0.005 MIU/L-ACNC: 1.5 UIU/ML (ref 0.27–4.2)
WBC # BLD AUTO: 7.6 K/UL (ref 4.8–10.8)

## 2024-11-15 LAB — PSA SERPL-MCNC: 4.54 NG/ML (ref 0–4)

## 2024-11-18 SDOH — ECONOMIC STABILITY: FOOD INSECURITY: WITHIN THE PAST 12 MONTHS, THE FOOD YOU BOUGHT JUST DIDN'T LAST AND YOU DIDN'T HAVE MONEY TO GET MORE.: NEVER TRUE

## 2024-11-18 SDOH — ECONOMIC STABILITY: FOOD INSECURITY: WITHIN THE PAST 12 MONTHS, YOU WORRIED THAT YOUR FOOD WOULD RUN OUT BEFORE YOU GOT MONEY TO BUY MORE.: NEVER TRUE

## 2024-11-18 SDOH — ECONOMIC STABILITY: INCOME INSECURITY: HOW HARD IS IT FOR YOU TO PAY FOR THE VERY BASICS LIKE FOOD, HOUSING, MEDICAL CARE, AND HEATING?: NOT HARD AT ALL

## 2024-11-18 SDOH — ECONOMIC STABILITY: TRANSPORTATION INSECURITY
IN THE PAST 12 MONTHS, HAS LACK OF TRANSPORTATION KEPT YOU FROM MEETINGS, WORK, OR FROM GETTING THINGS NEEDED FOR DAILY LIVING?: NO

## 2024-11-18 ASSESSMENT — PATIENT HEALTH QUESTIONNAIRE - PHQ9
SUM OF ALL RESPONSES TO PHQ QUESTIONS 1-9: 0
SUM OF ALL RESPONSES TO PHQ9 QUESTIONS 1 & 2: 0
1. LITTLE INTEREST OR PLEASURE IN DOING THINGS: NOT AT ALL
2. FEELING DOWN, DEPRESSED OR HOPELESS: NOT AT ALL
SUM OF ALL RESPONSES TO PHQ9 QUESTIONS 1 & 2: 0
SUM OF ALL RESPONSES TO PHQ QUESTIONS 1-9: 0
1. LITTLE INTEREST OR PLEASURE IN DOING THINGS: NOT AT ALL
2. FEELING DOWN, DEPRESSED OR HOPELESS: NOT AT ALL
1. LITTLE INTEREST OR PLEASURE IN DOING THINGS: NOT AT ALL
SUM OF ALL RESPONSES TO PHQ9 QUESTIONS 1 & 2: 0
SUM OF ALL RESPONSES TO PHQ QUESTIONS 1-9: 0
2. FEELING DOWN, DEPRESSED OR HOPELESS: NOT AT ALL
SUM OF ALL RESPONSES TO PHQ QUESTIONS 1-9: 0
SUM OF ALL RESPONSES TO PHQ QUESTIONS 1-9: 0

## 2024-11-21 ENCOUNTER — OFFICE VISIT (OUTPATIENT)
Dept: INTERNAL MEDICINE | Age: 79
End: 2024-11-21
Payer: MEDICARE

## 2024-11-21 VITALS
DIASTOLIC BLOOD PRESSURE: 60 MMHG | BODY MASS INDEX: 31.21 KG/M2 | WEIGHT: 218 LBS | HEART RATE: 67 BPM | SYSTOLIC BLOOD PRESSURE: 100 MMHG | OXYGEN SATURATION: 98 % | HEIGHT: 70 IN

## 2024-11-21 DIAGNOSIS — J44.1 CHRONIC OBSTRUCTIVE PULMONARY DISEASE WITH ACUTE EXACERBATION (HCC): ICD-10-CM

## 2024-11-21 DIAGNOSIS — E78.2 MIXED HYPERLIPIDEMIA: ICD-10-CM

## 2024-11-21 DIAGNOSIS — I10 PRIMARY HYPERTENSION: ICD-10-CM

## 2024-11-21 DIAGNOSIS — H02.125 MECHANICAL ECTROPION OF LOWER EYELIDS OF BOTH EYES: ICD-10-CM

## 2024-11-21 DIAGNOSIS — H02.122 MECHANICAL ECTROPION OF LOWER EYELIDS OF BOTH EYES: ICD-10-CM

## 2024-11-21 DIAGNOSIS — Z87.898 HISTORY OF ELEVATED PSA: ICD-10-CM

## 2024-11-21 DIAGNOSIS — Z00.00 MEDICARE ANNUAL WELLNESS VISIT, SUBSEQUENT: Primary | ICD-10-CM

## 2024-11-21 PROCEDURE — 1123F ACP DISCUSS/DSCN MKR DOCD: CPT | Performed by: INTERNAL MEDICINE

## 2024-11-21 PROCEDURE — G8484 FLU IMMUNIZE NO ADMIN: HCPCS | Performed by: INTERNAL MEDICINE

## 2024-11-21 PROCEDURE — G0439 PPPS, SUBSEQ VISIT: HCPCS | Performed by: INTERNAL MEDICINE

## 2024-11-21 PROCEDURE — 1159F MED LIST DOCD IN RCRD: CPT | Performed by: INTERNAL MEDICINE

## 2024-11-21 PROCEDURE — 3074F SYST BP LT 130 MM HG: CPT | Performed by: INTERNAL MEDICINE

## 2024-11-21 PROCEDURE — 3078F DIAST BP <80 MM HG: CPT | Performed by: INTERNAL MEDICINE

## 2024-11-21 ASSESSMENT — PATIENT HEALTH QUESTIONNAIRE - PHQ9
1. LITTLE INTEREST OR PLEASURE IN DOING THINGS: NOT AT ALL
SUM OF ALL RESPONSES TO PHQ QUESTIONS 1-9: 0
2. FEELING DOWN, DEPRESSED OR HOPELESS: NOT AT ALL
SUM OF ALL RESPONSES TO PHQ QUESTIONS 1-9: 0
SUM OF ALL RESPONSES TO PHQ QUESTIONS 1-9: 0
SUM OF ALL RESPONSES TO PHQ9 QUESTIONS 1 & 2: 0
SUM OF ALL RESPONSES TO PHQ QUESTIONS 1-9: 0

## 2024-11-21 ASSESSMENT — LIFESTYLE VARIABLES
HOW OFTEN DO YOU HAVE A DRINK CONTAINING ALCOHOL: PATIENT DECLINED
HOW MANY STANDARD DRINKS CONTAINING ALCOHOL DO YOU HAVE ON A TYPICAL DAY: PATIENT DECLINED

## 2024-11-21 NOTE — PROGRESS NOTES
referral to urology but also d/w guidelines to not check at his age- he declines referral and we will not plan on checking this    4. 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 Chronic obstructive pulmonary disease with acute exacerbation (HCC)  Stable and has quit smoking     6. 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  Recommended screening schedule for the next 5-10 years is provided to the patient in written form: see Patient Instructions/AVS.     Return in about 1 year (around 11/28/2025) for medicare annual wellness.     Subjective       Patient's complete Health Risk Assessment and screening values have been reviewed and are found in Flowsheets. The following problems were reviewed today and where indicated follow up appointments were made and/or referrals ordered.    Positive Risk Factor Screenings with Interventions:             General HRA Questions:  Select all that apply: (!) Stress  Interventions - Stress:  Ongoing stressors but doing okay      Inactivity:  On average, how many days per week do you engage in moderate to strenuous exercise (like a brisk walk)?: 0 days (!) Abnormal  On average, how many minutes do you engage in exercise at this level?: 0 min  Interventions:  Increase activity exercise     Abnormal BMI (obese):  Body mass index is 31.28 kg/m². (!) Abnormal  Interventions:  Work on

## 2024-12-12 PROBLEM — H02.122 MECHANICAL ECTROPION OF LOWER EYELIDS OF BOTH EYES: Status: ACTIVE | Noted: 2024-12-12

## 2024-12-12 PROBLEM — H02.125 MECHANICAL ECTROPION OF LOWER EYELIDS OF BOTH EYES: Status: ACTIVE | Noted: 2024-12-12

## 2024-12-12 ASSESSMENT — ENCOUNTER SYMPTOMS
SHORTNESS OF BREATH: 0
ABDOMINAL PAIN: 0
SINUS PRESSURE: 0
COUGH: 0
BACK PAIN: 0
ABDOMINAL DISTENTION: 0
EYE REDNESS: 1
EYE DISCHARGE: 1

## 2025-04-24 RX ORDER — TIMOLOL MALEATE 5 MG/ML
SOLUTION/ DROPS OPHTHALMIC
Qty: 10 ML | Refills: 4 | Status: SHIPPED | OUTPATIENT
Start: 2025-04-24